# Patient Record
Sex: FEMALE | Race: WHITE | Employment: OTHER | URBAN - METROPOLITAN AREA
[De-identification: names, ages, dates, MRNs, and addresses within clinical notes are randomized per-mention and may not be internally consistent; named-entity substitution may affect disease eponyms.]

---

## 2018-03-28 RX ORDER — METOPROLOL SUCCINATE 100 MG/1
100 TABLET, EXTENDED RELEASE ORAL DAILY
Qty: 90 TABLET | Refills: 3 | Status: SHIPPED | OUTPATIENT
Start: 2018-03-28 | End: 2018-04-18 | Stop reason: DRUGHIGH

## 2018-04-06 ENCOUNTER — HOSPITAL ENCOUNTER (OUTPATIENT)
Age: 73
Discharge: HOME | End: 2018-04-08
Payer: MEDICARE

## 2018-04-06 ENCOUNTER — OFFICE VISIT (OUTPATIENT)
Dept: CARDIOLOGY CLINIC | Age: 73
End: 2018-04-06
Payer: MEDICARE

## 2018-04-06 VITALS
SYSTOLIC BLOOD PRESSURE: 124 MMHG | HEIGHT: 64 IN | HEART RATE: 81 BPM | BODY MASS INDEX: 26.43 KG/M2 | RESPIRATION RATE: 18 BRPM | WEIGHT: 154.8 LBS | DIASTOLIC BLOOD PRESSURE: 82 MMHG

## 2018-04-06 DIAGNOSIS — R94.6 THYROID FUNCTION STUDY ABNORMALITY: ICD-10-CM

## 2018-04-06 DIAGNOSIS — I49.9 IRREGULAR HEART BEAT: Primary | ICD-10-CM

## 2018-04-06 DIAGNOSIS — R06.02 SHORTNESS OF BREATH: ICD-10-CM

## 2018-04-06 LAB
PRO-BNP: 881 PG/ML (ref 0–125)
T3 FREE: 2.5 PG/ML (ref 2–4.4)
T4 FREE: 1.6 NG/DL (ref 0.93–1.7)

## 2018-04-06 PROCEDURE — 93000 ELECTROCARDIOGRAM COMPLETE: CPT | Performed by: INTERNAL MEDICINE

## 2018-04-06 PROCEDURE — 4040F PNEUMOC VAC/ADMIN/RCVD: CPT | Performed by: INTERNAL MEDICINE

## 2018-04-06 PROCEDURE — 1123F ACP DISCUSS/DSCN MKR DOCD: CPT | Performed by: INTERNAL MEDICINE

## 2018-04-06 PROCEDURE — G8419 CALC BMI OUT NRM PARAM NOF/U: HCPCS | Performed by: INTERNAL MEDICINE

## 2018-04-06 PROCEDURE — 83880 ASSAY OF NATRIURETIC PEPTIDE: CPT

## 2018-04-06 PROCEDURE — 3017F COLORECTAL CA SCREEN DOC REV: CPT | Performed by: INTERNAL MEDICINE

## 2018-04-06 PROCEDURE — 1090F PRES/ABSN URINE INCON ASSESS: CPT | Performed by: INTERNAL MEDICINE

## 2018-04-06 PROCEDURE — 99213 OFFICE O/P EST LOW 20 MIN: CPT | Performed by: INTERNAL MEDICINE

## 2018-04-06 PROCEDURE — 84439 ASSAY OF FREE THYROXINE: CPT

## 2018-04-06 PROCEDURE — 3014F SCREEN MAMMO DOC REV: CPT | Performed by: INTERNAL MEDICINE

## 2018-04-06 PROCEDURE — G8427 DOCREV CUR MEDS BY ELIG CLIN: HCPCS | Performed by: INTERNAL MEDICINE

## 2018-04-06 PROCEDURE — G8400 PT W/DXA NO RESULTS DOC: HCPCS | Performed by: INTERNAL MEDICINE

## 2018-04-06 PROCEDURE — 84481 FREE ASSAY (FT-3): CPT

## 2018-04-06 PROCEDURE — 1036F TOBACCO NON-USER: CPT | Performed by: INTERNAL MEDICINE

## 2018-04-12 ENCOUNTER — HOSPITAL ENCOUNTER (OUTPATIENT)
Dept: CARDIOLOGY | Age: 73
Discharge: HOME | End: 2018-04-12
Payer: MEDICARE

## 2018-04-12 DIAGNOSIS — R06.02 SHORTNESS OF BREATH: ICD-10-CM

## 2018-04-12 DIAGNOSIS — I49.9 IRREGULAR HEART BEAT: ICD-10-CM

## 2018-04-12 LAB
LV EF: 46 %
LVEF MODALITY: NORMAL

## 2018-04-12 PROCEDURE — 93306 TTE W/DOPPLER COMPLETE: CPT | Performed by: PSYCHIATRY & NEUROLOGY

## 2018-04-18 ENCOUNTER — TELEPHONE (OUTPATIENT)
Dept: CARDIOLOGY CLINIC | Age: 73
End: 2018-04-18

## 2018-04-18 RX ORDER — METOPROLOL SUCCINATE 100 MG/1
150 TABLET, EXTENDED RELEASE ORAL DAILY
Qty: 90 TABLET | Refills: 3 | Status: SHIPPED
Start: 2018-04-18 | End: 2018-05-29 | Stop reason: SDUPTHER

## 2018-05-08 ENCOUNTER — OFFICE VISIT (OUTPATIENT)
Dept: CARDIOLOGY CLINIC | Age: 73
End: 2018-05-08
Payer: MEDICARE

## 2018-05-08 VITALS
RESPIRATION RATE: 16 BRPM | DIASTOLIC BLOOD PRESSURE: 80 MMHG | BODY MASS INDEX: 26.41 KG/M2 | WEIGHT: 154.7 LBS | HEIGHT: 64 IN | HEART RATE: 89 BPM | SYSTOLIC BLOOD PRESSURE: 124 MMHG

## 2018-05-08 DIAGNOSIS — I49.9 IRREGULAR HEARTBEAT: Primary | ICD-10-CM

## 2018-05-08 PROCEDURE — 4040F PNEUMOC VAC/ADMIN/RCVD: CPT | Performed by: INTERNAL MEDICINE

## 2018-05-08 PROCEDURE — 1123F ACP DISCUSS/DSCN MKR DOCD: CPT | Performed by: INTERNAL MEDICINE

## 2018-05-08 PROCEDURE — 1036F TOBACCO NON-USER: CPT | Performed by: INTERNAL MEDICINE

## 2018-05-08 PROCEDURE — G8400 PT W/DXA NO RESULTS DOC: HCPCS | Performed by: INTERNAL MEDICINE

## 2018-05-08 PROCEDURE — 3017F COLORECTAL CA SCREEN DOC REV: CPT | Performed by: INTERNAL MEDICINE

## 2018-05-08 PROCEDURE — G8427 DOCREV CUR MEDS BY ELIG CLIN: HCPCS | Performed by: INTERNAL MEDICINE

## 2018-05-08 PROCEDURE — 1090F PRES/ABSN URINE INCON ASSESS: CPT | Performed by: INTERNAL MEDICINE

## 2018-05-08 PROCEDURE — 93000 ELECTROCARDIOGRAM COMPLETE: CPT | Performed by: INTERNAL MEDICINE

## 2018-05-08 PROCEDURE — 99213 OFFICE O/P EST LOW 20 MIN: CPT | Performed by: INTERNAL MEDICINE

## 2018-05-08 PROCEDURE — G8419 CALC BMI OUT NRM PARAM NOF/U: HCPCS | Performed by: INTERNAL MEDICINE

## 2018-05-08 RX ORDER — MIRTAZAPINE 7.5 MG/1
7.5 TABLET, FILM COATED ORAL NIGHTLY
Refills: 2 | COMMUNITY
Start: 2018-04-23

## 2018-05-29 RX ORDER — METOPROLOL SUCCINATE 100 MG/1
150 TABLET, EXTENDED RELEASE ORAL DAILY
Qty: 135 TABLET | Refills: 3 | Status: SHIPPED | OUTPATIENT
Start: 2018-05-29 | End: 2019-05-20 | Stop reason: SDUPTHER

## 2018-06-15 ENCOUNTER — OFFICE VISIT (OUTPATIENT)
Dept: CARDIOLOGY CLINIC | Age: 73
End: 2018-06-15
Payer: MEDICARE

## 2018-06-15 VITALS
HEIGHT: 64 IN | WEIGHT: 156 LBS | DIASTOLIC BLOOD PRESSURE: 80 MMHG | HEART RATE: 91 BPM | SYSTOLIC BLOOD PRESSURE: 110 MMHG | RESPIRATION RATE: 16 BRPM | BODY MASS INDEX: 26.63 KG/M2

## 2018-06-15 DIAGNOSIS — I48.91 ATRIAL FIBRILLATION, UNSPECIFIED TYPE (HCC): Primary | ICD-10-CM

## 2018-06-15 PROCEDURE — 93000 ELECTROCARDIOGRAM COMPLETE: CPT | Performed by: INTERNAL MEDICINE

## 2018-06-15 PROCEDURE — G8400 PT W/DXA NO RESULTS DOC: HCPCS | Performed by: INTERNAL MEDICINE

## 2018-06-15 PROCEDURE — G8427 DOCREV CUR MEDS BY ELIG CLIN: HCPCS | Performed by: INTERNAL MEDICINE

## 2018-06-15 PROCEDURE — 1036F TOBACCO NON-USER: CPT | Performed by: INTERNAL MEDICINE

## 2018-06-15 PROCEDURE — 99213 OFFICE O/P EST LOW 20 MIN: CPT | Performed by: INTERNAL MEDICINE

## 2018-06-15 PROCEDURE — 3017F COLORECTAL CA SCREEN DOC REV: CPT | Performed by: INTERNAL MEDICINE

## 2018-06-15 PROCEDURE — 1090F PRES/ABSN URINE INCON ASSESS: CPT | Performed by: INTERNAL MEDICINE

## 2018-06-15 PROCEDURE — 4040F PNEUMOC VAC/ADMIN/RCVD: CPT | Performed by: INTERNAL MEDICINE

## 2018-06-15 PROCEDURE — 1123F ACP DISCUSS/DSCN MKR DOCD: CPT | Performed by: INTERNAL MEDICINE

## 2018-06-15 PROCEDURE — G8419 CALC BMI OUT NRM PARAM NOF/U: HCPCS | Performed by: INTERNAL MEDICINE

## 2018-07-26 ENCOUNTER — OFFICE VISIT (OUTPATIENT)
Dept: CARDIOLOGY CLINIC | Age: 73
End: 2018-07-26
Payer: MEDICARE

## 2018-07-26 VITALS
HEIGHT: 64 IN | BODY MASS INDEX: 27.13 KG/M2 | SYSTOLIC BLOOD PRESSURE: 140 MMHG | WEIGHT: 158.9 LBS | DIASTOLIC BLOOD PRESSURE: 84 MMHG | RESPIRATION RATE: 16 BRPM | HEART RATE: 89 BPM

## 2018-07-26 DIAGNOSIS — R55 SYNCOPE, UNSPECIFIED SYNCOPE TYPE: Primary | ICD-10-CM

## 2018-07-26 PROCEDURE — 1090F PRES/ABSN URINE INCON ASSESS: CPT | Performed by: INTERNAL MEDICINE

## 2018-07-26 PROCEDURE — 1101F PT FALLS ASSESS-DOCD LE1/YR: CPT | Performed by: INTERNAL MEDICINE

## 2018-07-26 PROCEDURE — 4040F PNEUMOC VAC/ADMIN/RCVD: CPT | Performed by: INTERNAL MEDICINE

## 2018-07-26 PROCEDURE — 3017F COLORECTAL CA SCREEN DOC REV: CPT | Performed by: INTERNAL MEDICINE

## 2018-07-26 PROCEDURE — G8419 CALC BMI OUT NRM PARAM NOF/U: HCPCS | Performed by: INTERNAL MEDICINE

## 2018-07-26 PROCEDURE — G8427 DOCREV CUR MEDS BY ELIG CLIN: HCPCS | Performed by: INTERNAL MEDICINE

## 2018-07-26 PROCEDURE — 1036F TOBACCO NON-USER: CPT | Performed by: INTERNAL MEDICINE

## 2018-07-26 PROCEDURE — 99213 OFFICE O/P EST LOW 20 MIN: CPT | Performed by: INTERNAL MEDICINE

## 2018-07-26 PROCEDURE — 93000 ELECTROCARDIOGRAM COMPLETE: CPT | Performed by: INTERNAL MEDICINE

## 2018-07-26 PROCEDURE — 1123F ACP DISCUSS/DSCN MKR DOCD: CPT | Performed by: INTERNAL MEDICINE

## 2018-07-26 PROCEDURE — G8400 PT W/DXA NO RESULTS DOC: HCPCS | Performed by: INTERNAL MEDICINE

## 2018-07-26 RX ORDER — LISINOPRIL 5 MG/1
5 TABLET ORAL DAILY
Qty: 90 TABLET | Refills: 3 | Status: SHIPPED | OUTPATIENT
Start: 2018-07-26 | End: 2019-07-16 | Stop reason: SDUPTHER

## 2018-07-26 RX ORDER — LISINOPRIL 5 MG/1
5 TABLET ORAL DAILY
Qty: 30 TABLET | Refills: 3 | Status: SHIPPED | OUTPATIENT
Start: 2018-07-26 | End: 2018-07-26 | Stop reason: SDUPTHER

## 2018-07-26 NOTE — PROGRESS NOTES
vomiting  Genitourinary:negative for dysuria and hematuria  Derm: negative for rash and skin lesion(s)  Neurological: negative for seizures and tremors  Endocrine: negative for diabetic symptoms including polydipsia and polyuria  Musculoskeletal: negative for CTD  Psychiatric: negative for psychosis and major depression    On examination, she is an alert, pleasant, elderly woman in no distress. Skin is warm and dry. Respirations are unlabored. BP (!) 140/84   Pulse 89   Resp 16   Ht 5' 4\" (1.626 m)   Wt 158 lb 14.4 oz (72.1 kg)   BMI 27.28 kg/m²  HEENT negative for scleral icterus. Extraocular muscles intact. No facial asymmetry or central cyanosis. Neck without masses or goiter. No bruit or JVD. Cardiac apex not displaced. Rhythm regular. Abdomen normal.  Extremities without edema. Lungs are clear. EKG today shows sinus rhythm at 89/m. (Posterior axis. IVCD. Nonspecific TR normality laterally. She is asymptomatic. She has a mild reduction in ejection fraction and her proBNP is only mildly elevated. She has hyperlipidemia and diabetes mellitus. Therefore, she should benefit by ACEI or ARB. She is given a prescription for lisinopril 5 mg per day and asked to see Dr. Garrett Yung back in the next 1-2 week for a blood pressure check and probably a BMP. She will have outpatient cardiac follow-up in one year.     She has begun an exercise program and is encouraged to maintain    At completion of today's visit, medications include the following:    Current Outpatient Prescriptions:     metoprolol succinate (TOPROL XL) 100 MG extended release tablet, Take 1.5 tablets by mouth daily, Disp: 135 tablet, Rfl: 3    mirtazapine (REMERON) 15 MG tablet, TK 1 T PO QD, Disp: , Rfl: 2    aspirin 81 MG tablet, Take 81 mg by mouth daily , Disp: , Rfl:     sitaGLIPtan-metformin (JANUMET)  MG per tablet, Take 1 tablet by mouth 2 times daily (with meals), Disp: , Rfl:     pantoprazole (PROTONIX) 20 MG

## 2019-01-21 ENCOUNTER — TELEPHONE (OUTPATIENT)
Dept: CARDIOLOGY CLINIC | Age: 74
End: 2019-01-21

## 2019-05-21 RX ORDER — METOPROLOL SUCCINATE 100 MG/1
TABLET, EXTENDED RELEASE ORAL
Qty: 270 TABLET | Refills: 3 | Status: SHIPPED | OUTPATIENT
Start: 2019-05-21 | End: 2019-08-07 | Stop reason: SDUPTHER

## 2019-07-17 RX ORDER — LISINOPRIL 5 MG/1
5 TABLET ORAL DAILY
Qty: 90 TABLET | Refills: 3 | OUTPATIENT
Start: 2019-07-17

## 2019-07-17 RX ORDER — LISINOPRIL 5 MG/1
5 TABLET ORAL DAILY
Qty: 90 TABLET | Refills: 3 | Status: SHIPPED
Start: 2019-07-17 | End: 2020-07-06

## 2019-07-23 ENCOUNTER — OFFICE VISIT (OUTPATIENT)
Dept: CARDIOLOGY CLINIC | Age: 74
End: 2019-07-23
Payer: MEDICARE

## 2019-07-23 VITALS
RESPIRATION RATE: 16 BRPM | WEIGHT: 159.8 LBS | DIASTOLIC BLOOD PRESSURE: 60 MMHG | HEIGHT: 64 IN | BODY MASS INDEX: 27.28 KG/M2 | HEART RATE: 77 BPM | SYSTOLIC BLOOD PRESSURE: 118 MMHG

## 2019-07-23 DIAGNOSIS — R94.30 EJECTION FRACTION < 50%: ICD-10-CM

## 2019-07-23 DIAGNOSIS — R06.02 SHORTNESS OF BREATH: ICD-10-CM

## 2019-07-23 DIAGNOSIS — R55 SYNCOPE, UNSPECIFIED SYNCOPE TYPE: Primary | ICD-10-CM

## 2019-07-23 PROCEDURE — 1090F PRES/ABSN URINE INCON ASSESS: CPT | Performed by: INTERNAL MEDICINE

## 2019-07-23 PROCEDURE — G8400 PT W/DXA NO RESULTS DOC: HCPCS | Performed by: INTERNAL MEDICINE

## 2019-07-23 PROCEDURE — 3017F COLORECTAL CA SCREEN DOC REV: CPT | Performed by: INTERNAL MEDICINE

## 2019-07-23 PROCEDURE — G8427 DOCREV CUR MEDS BY ELIG CLIN: HCPCS | Performed by: INTERNAL MEDICINE

## 2019-07-23 PROCEDURE — 99213 OFFICE O/P EST LOW 20 MIN: CPT | Performed by: INTERNAL MEDICINE

## 2019-07-23 PROCEDURE — 4040F PNEUMOC VAC/ADMIN/RCVD: CPT | Performed by: INTERNAL MEDICINE

## 2019-07-23 PROCEDURE — 1123F ACP DISCUSS/DSCN MKR DOCD: CPT | Performed by: INTERNAL MEDICINE

## 2019-07-23 PROCEDURE — 93000 ELECTROCARDIOGRAM COMPLETE: CPT | Performed by: INTERNAL MEDICINE

## 2019-07-23 PROCEDURE — G8419 CALC BMI OUT NRM PARAM NOF/U: HCPCS | Performed by: INTERNAL MEDICINE

## 2019-07-23 PROCEDURE — 1036F TOBACCO NON-USER: CPT | Performed by: INTERNAL MEDICINE

## 2019-07-23 RX ORDER — GUAIFENESIN AND DEXTROMETHORPHAN HYDROBROMIDE 600; 30 MG/1; MG/1
TABLET, EXTENDED RELEASE ORAL
COMMUNITY

## 2019-08-01 ENCOUNTER — HOSPITAL ENCOUNTER (OUTPATIENT)
Dept: CARDIOLOGY | Age: 74
Discharge: HOME | End: 2019-08-01
Payer: MEDICARE

## 2019-08-01 DIAGNOSIS — R94.30 EJECTION FRACTION < 50%: ICD-10-CM

## 2019-08-01 DIAGNOSIS — R06.02 SHORTNESS OF BREATH: ICD-10-CM

## 2019-08-01 PROCEDURE — 93308 TTE F-UP OR LMTD: CPT

## 2019-08-07 ENCOUNTER — TELEPHONE (OUTPATIENT)
Dept: CARDIOLOGY CLINIC | Age: 74
End: 2019-08-07

## 2019-08-08 RX ORDER — METOPROLOL SUCCINATE 100 MG/1
100 TABLET, EXTENDED RELEASE ORAL 2 TIMES DAILY
Qty: 180 TABLET | Refills: 3
Start: 2019-08-08 | End: 2020-08-06

## 2020-07-06 RX ORDER — LISINOPRIL 5 MG/1
TABLET ORAL
Qty: 90 TABLET | Refills: 3 | Status: SHIPPED
Start: 2020-07-06 | End: 2021-06-28

## 2020-08-07 RX ORDER — METOPROLOL SUCCINATE 100 MG/1
TABLET, EXTENDED RELEASE ORAL
Qty: 270 TABLET | Refills: 0 | Status: SHIPPED
Start: 2020-08-07 | End: 2021-02-01 | Stop reason: SDUPTHER

## 2020-10-15 ENCOUNTER — OFFICE VISIT (OUTPATIENT)
Dept: CARDIOLOGY CLINIC | Age: 75
End: 2020-10-15
Payer: MEDICARE

## 2020-10-15 VITALS
BODY MASS INDEX: 28.51 KG/M2 | HEART RATE: 74 BPM | WEIGHT: 167 LBS | HEIGHT: 64 IN | RESPIRATION RATE: 18 BRPM | DIASTOLIC BLOOD PRESSURE: 66 MMHG | SYSTOLIC BLOOD PRESSURE: 108 MMHG

## 2020-10-15 PROCEDURE — 93000 ELECTROCARDIOGRAM COMPLETE: CPT | Performed by: INTERNAL MEDICINE

## 2020-10-15 PROCEDURE — 99213 OFFICE O/P EST LOW 20 MIN: CPT | Performed by: INTERNAL MEDICINE

## 2020-10-15 PROCEDURE — 4040F PNEUMOC VAC/ADMIN/RCVD: CPT | Performed by: INTERNAL MEDICINE

## 2020-10-15 PROCEDURE — 3017F COLORECTAL CA SCREEN DOC REV: CPT | Performed by: INTERNAL MEDICINE

## 2020-10-15 PROCEDURE — 1090F PRES/ABSN URINE INCON ASSESS: CPT | Performed by: INTERNAL MEDICINE

## 2020-10-15 PROCEDURE — G8427 DOCREV CUR MEDS BY ELIG CLIN: HCPCS | Performed by: INTERNAL MEDICINE

## 2020-10-15 PROCEDURE — G8400 PT W/DXA NO RESULTS DOC: HCPCS | Performed by: INTERNAL MEDICINE

## 2020-10-15 PROCEDURE — G8484 FLU IMMUNIZE NO ADMIN: HCPCS | Performed by: INTERNAL MEDICINE

## 2020-10-15 PROCEDURE — 1036F TOBACCO NON-USER: CPT | Performed by: INTERNAL MEDICINE

## 2020-10-15 PROCEDURE — G8417 CALC BMI ABV UP PARAM F/U: HCPCS | Performed by: INTERNAL MEDICINE

## 2020-10-15 PROCEDURE — 1123F ACP DISCUSS/DSCN MKR DOCD: CPT | Performed by: INTERNAL MEDICINE

## 2020-10-15 RX ORDER — METFORMIN HYDROCHLORIDE 750 MG/1
750 TABLET, EXTENDED RELEASE ORAL
COMMUNITY
End: 2022-01-27 | Stop reason: ALTCHOICE

## 2020-10-15 NOTE — PROGRESS NOTES
[facility]  Jennifer Lesser. KEVIN Sears. Wei Small M.D.  Marissa Riddle. Michelle Bell M.D. Veneta Portela, Darlena Bloch, M.D. Edison Bellamy M.D. MD Denzel Christensen   1945  Francois Gupta MD      This 82-year-old woman is seen today for outpatient cardiac assessment. She has a history of catheterization documented ASCVD (2015). She has had hypertension hyperlipidemia and diabetes. An echo in 2017 showed mild depression in LVEF with evidence for diastolic dysfunction. She is sedentary. She is not having any chest pain or dyspnea. She fatigues easily. Medical History:  1. ASCVD. Angiogram 02/2015 [facility] Moderate CAD. 2. Asthma. 3. History of chest pain. 4. Hyperlipidemia. Cholesterol 112 , triglycerides 117, HDL 43, LDL 54 (2016). 5. Hypertension. 6. Paroxysmal SVT. 7. Diabetes mellitus. 8. GERD. 9. Syncope 12/11/2016. 10. Echo 01/13/2017. Severe LA dilatation. LA index 54. Distal septum and apex appear dyskinetic. No myocardial thinning.  Lateral and inferior hypokinesis.  Biplane EF = 45%. Mild MR. ERO = 0.1. Vena contracta 0.2 cm. 11. Lexiscan MPS 01/19/2017. EF 43%. Small fixed apical defect. Small reversible defect inferoseptal segment. 12. OP cardiac assessment 04/04/2017.  Asymptomatic on metoprolol succinate 100 mg daily. 13. Thyroid function: T4 = 1.6     T3= 2.5  -  04/2018. 14. Echo, 04/12/2018. Biplane EF 46%.  Global hypokinesis. Stage II diastolic dysfunction.  LA mildly dilated.  RVSP normal.   15. ProBNP=  881 - 04/12/2018. 16. OP cardiac assessment, 06/15/18. Systolic 002. No medication changes. 17. Lipid panel 09/2020:  Total cholesterol 159, triglycerides 165, HDL 44, LDL 82      Review of Systems:  Constitutional: negative for fever and chills  Respiratory: negative for cough and hemoptysis  Cardiovascular:   Gastrointestinal: negative for abdominal pain, diarrhea, nausea and vomiting  Genitourinary:negative for dysuria and hematuria  Derm: negative for rash and skin lesion(s)  Neurological: negative for seizures and tremors  Endocrine: negative for diabetic symptoms including polydipsia and polyuria  Musculoskeletal: negative for CTD  Psychiatric: negative for psychosis and major depression    On examination, she is an alert pleasant elderly woman in no distress   skin is warm and dry. Respirations are unlabored. /66   Pulse 74   Resp 18   Ht 5' 4\" (1.626 m)   Wt 167 lb (75.8 kg)   BMI 28.67 kg/m² . HEENT negative for scleral icterus. Extraocular muscles intact. No facial asymmetry or central cyanosis. Neck without masses or goiter. No bruit or JVD. Cardiac apex not displaced. Rhythm regular. Abdomen normal.  Extremities without edema. Lungs are clear    EKG today per Dr. Parsons Lines interpretation: Sinus rhythm 74/min. Mild nonspecific IVCD and posterior-leftward axis    Clinically the patient is stable from a cardiovascular standpoint. She has ASCVD and diabetes. She will benefit by secondary risk reduction. Current recommendations are to target LDL 70 or less. . And adjustments made per recommendations. She will have cardiac follow-up in 1 year.     At completion of today's visit, medications include the following:    Current Outpatient Medications:     SITagliptin (JANUVIA) 100 MG tablet, Take 100 mg by mouth daily, Disp: , Rfl:     metFORMIN (GLUCOPHAGE-XR) 750 MG extended release tablet, Take 750 mg by mouth daily (with breakfast), Disp: , Rfl:     metoprolol succinate (TOPROL XL) 100 MG extended release tablet, TAKE 1 AND 1/2 TABLETS BY MOUTH EVERY DAY, Disp: 270 tablet, Rfl: 0    lisinopril (PRINIVIL;ZESTRIL) 5 MG tablet, TAKE 1 TABLET BY MOUTH EVERY DAY, Disp: 90 tablet, Rfl: 3    PROAIR  (90 Base) MCG/ACT inhaler, INL 2 PFS PO 6 TIMES PER DAY PRN, Disp: , Rfl: 0    Dextromethorphan-guaiFENesin (MUCINEX DM)  MG TB12, Take by mouth, Disp: , Rfl:     mirtazapine (REMERON) 7.5 MG tablet, 7.5 mg nightly , Disp: , Rfl: 2    aspirin 81 MG tablet, Take 81 mg by mouth daily , Disp: , Rfl:     pantoprazole (PROTONIX) 20 MG tablet, Take 20 mg by mouth daily, Disp: , Rfl:     atorvastatin (LIPITOR) 40 MG tablet, Take 40 mg by mouth daily, Disp: , Rfl:     sitaGLIPtan-metformin (JANUMET)  MG per tablet, Take 1 tablet by mouth 2 times daily (with meals), Disp: , Rfl:       Note: This report was completed utilizing computer voice recognition software. Every effort has been made to ensure accuracy, however; inadvertent computerized transcription errors may be present.     --Alexsander Woody MD on 10/15/2020 at 6:11 PM

## 2021-02-01 RX ORDER — METOPROLOL SUCCINATE 100 MG/1
TABLET, EXTENDED RELEASE ORAL
Qty: 270 TABLET | Refills: 3 | Status: SHIPPED | OUTPATIENT
Start: 2021-02-01 | End: 2022-07-18 | Stop reason: SDUPTHER

## 2021-06-28 RX ORDER — LISINOPRIL 5 MG/1
TABLET ORAL
Qty: 90 TABLET | Refills: 3 | Status: SHIPPED | OUTPATIENT
Start: 2021-06-28

## 2021-08-24 ENCOUNTER — TELEPHONE (OUTPATIENT)
Dept: CARDIOLOGY CLINIC | Age: 76
End: 2021-08-24

## 2021-08-24 NOTE — TELEPHONE ENCOUNTER
[facility] pt. Pt called to sched Annual w/ [facility]. Unable to sched pt at this time.  Pt due approx 10/15/2021

## 2021-08-25 NOTE — TELEPHONE ENCOUNTER
Left message for patient to contact office. Patient to be scheduled with Dr. Shayy Ross the week of 10/4/2021.

## 2021-09-12 ENCOUNTER — APPOINTMENT (OUTPATIENT)
Dept: GENERAL RADIOLOGY | Age: 76
End: 2021-09-12
Payer: MEDICARE

## 2021-09-12 ENCOUNTER — HOSPITAL ENCOUNTER (EMERGENCY)
Age: 76
Discharge: HOME | End: 2021-09-12
Payer: MEDICARE

## 2021-09-12 VITALS
RESPIRATION RATE: 14 BRPM | HEIGHT: 64 IN | HEART RATE: 75 BPM | BODY MASS INDEX: 27.14 KG/M2 | OXYGEN SATURATION: 93 % | WEIGHT: 159 LBS | DIASTOLIC BLOOD PRESSURE: 68 MMHG | SYSTOLIC BLOOD PRESSURE: 157 MMHG | TEMPERATURE: 97.2 F

## 2021-09-12 DIAGNOSIS — S53.409A ELBOW SPRAIN, INITIAL ENCOUNTER: ICD-10-CM

## 2021-09-12 DIAGNOSIS — S42.212A FX HUMERAL NECK, LEFT, CLOSED, INITIAL ENCOUNTER: Primary | ICD-10-CM
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
## 2021-09-12 PROCEDURE — 6370000000 HC RX 637 (ALT 250 FOR IP): Performed by: NURSE PRACTITIONER

## 2021-09-12 PROCEDURE — 73060 X-RAY EXAM OF HUMERUS: CPT

## 2021-09-12 PROCEDURE — 73070 X-RAY EXAM OF ELBOW: CPT

## 2021-09-12 PROCEDURE — 73030 X-RAY EXAM OF SHOULDER: CPT

## 2021-09-12 PROCEDURE — 99285 EMERGENCY DEPT VISIT HI MDM: CPT

## 2021-09-12 RX ORDER — HYDROCODONE BITARTRATE AND ACETAMINOPHEN 5; 325 MG/1; MG/1
1 TABLET ORAL ONCE
Status: COMPLETED | OUTPATIENT
Start: 2021-09-12 | End: 2021-09-12

## 2021-09-12 RX ORDER — HYDROCODONE BITARTRATE AND ACETAMINOPHEN 5; 325 MG/1; MG/1
1 TABLET ORAL EVERY 6 HOURS PRN
Qty: 12 TABLET | Refills: 0 | Status: SHIPPED | OUTPATIENT
Start: 2021-09-12 | End: 2021-09-15

## 2021-09-12 RX ORDER — ONDANSETRON 4 MG/1
4 TABLET, ORALLY DISINTEGRATING ORAL ONCE
Status: COMPLETED | OUTPATIENT
Start: 2021-09-12 | End: 2021-09-12

## 2021-09-12 RX ORDER — NAPROXEN 500 MG/1
500 TABLET ORAL 2 TIMES DAILY PRN
Qty: 14 TABLET | Refills: 0 | Status: SHIPPED | OUTPATIENT
Start: 2021-09-12 | End: 2022-01-05

## 2021-09-12 RX ADMIN — HYDROCODONE BITARTRATE AND ACETAMINOPHEN 1 TABLET: 5; 325 TABLET ORAL at 12:10

## 2021-09-12 RX ADMIN — ONDANSETRON 4 MG: 4 TABLET, ORALLY DISINTEGRATING ORAL at 12:10

## 2021-09-12 ASSESSMENT — PAIN SCALES - GENERAL
PAINLEVEL_OUTOF10: 10
PAINLEVEL_OUTOF10: 9

## 2021-09-12 ASSESSMENT — PAIN DESCRIPTION - ORIENTATION: ORIENTATION: LEFT

## 2021-09-12 ASSESSMENT — PAIN DESCRIPTION - PAIN TYPE: TYPE: ACUTE PAIN

## 2021-09-12 ASSESSMENT — PAIN DESCRIPTION - LOCATION: LOCATION: SHOULDER

## 2021-09-12 NOTE — ED PROVIDER NOTES
ED Attending  CC: Gabriela         [facility]  Department of Emergency Medicine   ED  Encounter Note  Admit Date/RoomTime: 2021 11:35 AM  ED Room: 37/    NAME: Eliana Calzada  : 1945  MRN: [id number]     Chief Complaint:  Arm Injury (mechanical fall this am fell onto lt arm Pain lt elbow to shoulder)    History of Present Illness       Eliana Calzada is a 78 y.o. old female who presents to the emergency department by private vehicle with , for a mechanical fall which occured 8 AM prior to arrival. She reportedly was hopping attempting to put her pants on and fell to her side onto her left elbow and shoulder region and has a positive deformity left upper arm. Patient is right-hand dominant. The patients tetanus status is unknown. Since onset the symptoms have been persistent and worsening. She is guarding all movements of her left arm and holding it close to her body. Her pain is aggraveated by any movement, any use of, certain movements or pressure on or palpation of painful area and relieved by nothing, as no treatment has been provided prior to this visit. She denies any head injury, headache, loss of consciousness, confusion, dizziness, neck pain, chest pain, abdominal pain, back pain, numbness, weakness and blurred vision. Patient does complain of nausea with no emesis. She denies any anticoagulation. She takes no blood thinning agents. ROS   Pertinent positives and negatives are stated within HPI, all other systems reviewed and are negative. Past Medical History:  has a past medical history of A-fib ([address] Asthma, Diabetes ([address] GERD (gastroesophageal reflux disease), Hyperlipidemia, and Syncope. Surgical History:  has a past surgical history that includes Tonsillectomy; Colonoscopy; and Endoscopy, colon, diagnostic. Social History:  reports that she has never smoked.  She has never used smokeless tobacco. She reports that she does not drink alcohol and does not use drugs. Family History: family history includes Asthma in her brother; Cancer in her father; Heart Disease (age of onset: 67) in her mother; High Blood Pressure in her mother; No Known Problems in her sister. Allergies: Levaquin [levofloxacin in d5w]    Physical Exam   Oxygen Saturation Interpretation: Normal.        ED Triage Vitals [09/12/21 1132]   BP Temp Temp Source Pulse Resp SpO2 Height Weight   (!) 157/68 97.2 °F (36.2 °C) Temporal 75 14 93 % 5' 4\" (1.626 m) 159 lb (72.1 kg)         Physical Exam  Constitutional:  Alert, development consistent with age. HEENT:  NC/NT. Airway patent. Atraumatic no raccoons or masters sign noted. No septal hematoma or hemotympanum. No oral abrasions or loose dentition. PERRLA. No facial bony tenderness. Neck:  No midline or paravertebral tenderness to firm palpation. .  Normal ROM. Supple. Chest:  Symmetrical without visible rash or tenderness. Respiratory:  Lungs Clear to auscultation and breath sounds equal.  CV:  Regular rate and rhythm, normal heart sounds, without pathological murmurs, ectopy, gallops, or rubs. GI:  Abdomen Soft, nontender, good bowel sounds. No firm or pulsatile mass. Pelvis:  Stable, nontender to palpation. Back:  No midline or paravertebral tenderness. No costovertebral tenderness. Extremities: No tenderness or edema noted. 2+ radial pulse on the left with tenderness to her elbow and shoulder region and guarding of movements of the left arm. Capillary refill less than 3 seconds in distal fingers. No snuffbox tenderness. No wrist tenderness. Strong hand grasp noted bilaterally. 2+ pedal and posterior tibial pulses intact. Integument:  Normal turgor. Warm, dry, without visible rash, unless noted elsewhere. Lymphatic: no lymphadenopathy noted  Neurological:  Oriented x3, GCS 15. Motor functions intact. Cranial nerves II through XII grossly intact.     Lab / Imaging Results   (All laboratory and radiology results have been personally reviewed by myself)  Labs:  No results found for this visit on 09/12/21. Imaging: All Radiology results interpreted by Radiologist unless otherwise noted. XR HUMERUS LEFT (MIN 2 VIEWS)   Final Result   Impacted minimally displaced fracture of the neck of the left humerus. No   additional fractures are identified. XR ELBOW LEFT (2 VIEWS)   Final Result   No acute abnormality. XR SHOULDER LEFT (MIN 2 VIEWS)   Final Result   Impacted minimally displaced fracture of the neck of the left humerus. No   additional fractures are identified. ED Course / Medical Decision Making     Medications   HYDROcodone-acetaminophen (NORCO) 5-325 MG per tablet 1 tablet (1 tablet Oral Given 9/12/21 1210)   ondansetron (ZOFRAN-ODT) disintegrating tablet 4 mg (4 mg Oral Given 9/12/21 1210)     ED Course as of Sep 12 2349   Sun Sep 12, 2021   1256 Dr. Lena Knowles into examine patient.    [SE]      ED Course User Index  [SE] BETSY Sidhu - CNP     Re-examination:  9/12/21     Time: 1230 patients symptoms are improving and is no longer nauseated pain improving. Consult(s):   None    Procedure(s):  None    MDM:   This is a 79-year-old female patient who arrives today after sustaining a mechanical fall and has a positive deformity of her left shoulder and x-ray reveals that she has a minimally impacted displaced fracture of the neck of the left humerus. Patient has no neurologic or sensory deficit on examination. All compartments are soft and compressible. Patient has intact radial pulses. X-ray of the elbow is negative for fracture. Patient has no midline bony tenderness to her cervical spine and denies any head injury and does not take anticoagulation. Patient was medicated with Norco and Zofran had improvement of symptoms once the sling was placed. Patient advised on follow-up with PCP and orthopedic surgeon in the next week.   Patient is concerned because she is going out of town for vacation and was instructed she should try to get into see the orthopedic before she goes on vacation. Patient also advised on signs and symptoms warranting immediate return to the ED for reevaluation at any time. Patient also instructed on opioid safety precautions. Controlled Substance Monitoring:    Acute and Chronic Pain Monitoring:   RX Monitoring 9/12/2021   Periodic Controlled Substance Monitoring Possible medication side effects, risk of tolerance/dependence & alternative treatments discussed. ;No signs of potential drug abuse or diversion identified. Plan of Care/Counseling:  BETSY Lerma CNP and EM Attending Physician reviewed today's visit with the patient in addition to providing specific details for the plan of care and counseling regarding the diagnosis and prognosis. Questions are answered at this time and are agreeable with the plan. Assessment      1. Fx humeral neck, left, closed, initial encounter    2. Elbow sprain, initial encounter    3. Fall on same level from slipping, tripping or stumbling, initial encounter      Plan   Discharged home. Patient condition is good    New Medications     Discharge Medication List as of 9/12/2021  1:04 PM      START taking these medications    Details   naproxen (NAPROSYN) 500 MG tablet Take 1 tablet by mouth 2 times daily as needed for Pain (take with food and full glass of water.), Disp-14 tablet, R-0Normal      HYDROcodone-acetaminophen (NORCO) 5-325 MG per tablet Take 1 tablet by mouth every 6 hours as needed for Pain for up to 3 days. , Disp-12 tablet, R-0Normal           Electronically signed by BETSY Lerma CNP   DD: 9/12/21  **This report was transcribed using voice recognition software. Every effort was made to ensure accuracy; however, inadvertent computerized transcription errors may be present.   END OF ED PROVIDER NOTE      BETSY Lerma CNP  09/12/21 8350

## 2021-09-13 ENCOUNTER — TELEPHONE (OUTPATIENT)
Dept: ADMINISTRATIVE | Age: 76
End: 2021-09-13

## 2021-09-13 NOTE — TELEPHONE ENCOUNTER
Patient called in to check status of appointment. They are leaving for vacation on Wednesday of this week and will return on 9/26 and would like to know if she should be seen before they go. Advised that a member of the office will be contacting them in regards tot getting scheduled.

## 2021-09-13 NOTE — TELEPHONE ENCOUNTER
Chief Complaint:  Arm Injury (mechanical fall this am fell onto lt arm Pain lt elbow to shoulder)    XR HUMERUS LEFT (MIN 2 VIEWS)   Final Result   Impacted minimally displaced fracture of the neck of the left humerus. No   additional fractures are identified.           XR ELBOW LEFT (2 VIEWS)   Final Result   No acute abnormality.           XR SHOULDER LEFT (MIN 2 VIEWS)   Final Result   Impacted minimally displaced fracture of the neck of the left humerus. No   additional fractures are identified. Routed to Providers for consideration and scheduling recommendations.

## 2021-09-13 NOTE — TELEPHONE ENCOUNTER
Pt was in [place] [id number] on 9/12 for:   Fx humeral neck, left, closed, initial encounter     Elbow sprain, initial encounter     Fall on same level from slipping, tripping or stumbling, initial encounter  And needs to have a follow up with Dr Daquan Briones.

## 2021-09-24 ENCOUNTER — TELEPHONE (OUTPATIENT)
Dept: ORTHOPEDIC SURGERY | Age: 76
End: 2021-09-24

## 2021-09-24 DIAGNOSIS — S42.202A CLOSED FRACTURE OF PROXIMAL END OF LEFT HUMERUS, UNSPECIFIED FRACTURE MORPHOLOGY, INITIAL ENCOUNTER: Primary | ICD-10-CM

## 2021-09-29 ENCOUNTER — OFFICE VISIT (OUTPATIENT)
Dept: ORTHOPEDIC SURGERY | Age: 76
End: 2021-09-29
Payer: MEDICARE

## 2021-09-29 ENCOUNTER — HOSPITAL ENCOUNTER (OUTPATIENT)
Dept: GENERAL RADIOLOGY | Age: 76
Discharge: HOME | End: 2021-10-01
Payer: MEDICARE

## 2021-09-29 VITALS — TEMPERATURE: 98.5 F

## 2021-09-29 DIAGNOSIS — S42.202A CLOSED FRACTURE OF PROXIMAL END OF LEFT HUMERUS, UNSPECIFIED FRACTURE MORPHOLOGY, INITIAL ENCOUNTER: Primary | ICD-10-CM

## 2021-09-29 DIAGNOSIS — S42.202A CLOSED FRACTURE OF PROXIMAL END OF LEFT HUMERUS, UNSPECIFIED FRACTURE MORPHOLOGY, INITIAL ENCOUNTER: ICD-10-CM

## 2021-09-29 PROCEDURE — G8417 CALC BMI ABV UP PARAM F/U: HCPCS | Performed by: ORTHOPAEDIC SURGERY

## 2021-09-29 PROCEDURE — 99202 OFFICE O/P NEW SF 15 MIN: CPT | Performed by: ORTHOPAEDIC SURGERY

## 2021-09-29 PROCEDURE — 99203 OFFICE O/P NEW LOW 30 MIN: CPT | Performed by: ORTHOPAEDIC SURGERY

## 2021-09-29 PROCEDURE — 73030 X-RAY EXAM OF SHOULDER: CPT

## 2021-09-29 PROCEDURE — 4040F PNEUMOC VAC/ADMIN/RCVD: CPT | Performed by: ORTHOPAEDIC SURGERY

## 2021-09-29 PROCEDURE — 1036F TOBACCO NON-USER: CPT | Performed by: ORTHOPAEDIC SURGERY

## 2021-09-29 PROCEDURE — 3017F COLORECTAL CA SCREEN DOC REV: CPT | Performed by: ORTHOPAEDIC SURGERY

## 2021-09-29 PROCEDURE — G8427 DOCREV CUR MEDS BY ELIG CLIN: HCPCS | Performed by: ORTHOPAEDIC SURGERY

## 2021-09-29 PROCEDURE — G8400 PT W/DXA NO RESULTS DOC: HCPCS | Performed by: ORTHOPAEDIC SURGERY

## 2021-09-29 PROCEDURE — 1123F ACP DISCUSS/DSCN MKR DOCD: CPT | Performed by: ORTHOPAEDIC SURGERY

## 2021-09-29 PROCEDURE — 1090F PRES/ABSN URINE INCON ASSESS: CPT | Performed by: ORTHOPAEDIC SURGERY

## 2021-09-29 RX ORDER — INSULIN DEGLUDEC INJECTION 100 U/ML
10 INJECTION, SOLUTION SUBCUTANEOUS DAILY
COMMUNITY
Start: 2021-09-15

## 2021-09-29 RX ORDER — HYDROCODONE BITARTRATE AND ACETAMINOPHEN 5; 325 MG/1; MG/1
1 TABLET ORAL EVERY 6 HOURS PRN
COMMUNITY
End: 2021-10-06 | Stop reason: SDUPTHER

## 2021-09-29 RX ORDER — EZETIMIBE 10 MG/1
1 TABLET ORAL DAILY
COMMUNITY
Start: 2021-08-29 | End: 2022-02-02 | Stop reason: ALTCHOICE

## 2021-09-29 RX ORDER — EMPAGLIFLOZIN 25 MG/1
25 TABLET, FILM COATED ORAL DAILY
COMMUNITY

## 2021-09-29 NOTE — PROGRESS NOTES
Chief Complaint   Patient presents with   [facility] ED Follow-up     L humeral neck fx 09/12/21. slipped while getting dressed. c/o soreness and pain. SUBJECTIVE: Patient is a very pleasant 44-year-old female who is status post fall on 9/12/2021. She suffered a left proximal humerus fracture. She was sent to us for definitive management. She is currently wearing a sling and controlling pain well with Norco provided by her primary care physician. She denies any other areas of pain. She is having significant pain in the evening especially when trying to go to sleep. She denies any further injury. Review of Systems   Constitutional: Negative for fever, chills, diaphoresis, appetite change and fatigue. HENT: Negative for dental issues, hearing loss and tinnitus. Negative for congestion, sinus pressure, sneezing, sore throat. Negative for headache. Eyes: Negative for visual disturbance, blurred and double vision. Negative for pain, discharge, redness and itching  Respiratory: Negative for cough, shortness of breath and wheezing. Cardiovascular: Negative for chest pain, palpitations and leg swelling. No dyspnea on exertion   Gastrointestinal:   Negative for nausea, vomiting, abdominal pain, diarrhea, constipation  or black or bloody. Hematologic\Lymphatic:  negative for bleeding, petechiae,   Genitourinary: Negative for hematuria and difficulty urinating. Musculoskeletal: Negative for neck pain and stiffness. Negative for back pain, see HPI  Skin: Negative for pallor, rash and wound. Neurological: Negative for dizziness, tremors, seizures, weakness, light-headedness, no TIA or stroke symptoms. No numbness and headaches. Psychiatric/Behavioral: Negative.         Past Medical History:   Diagnosis Date    [facility] [facility] [address] [address]     Asthma     [facility]     GERD (gastroesophageal reflux disease)     Hyperlipidemia     Syncope      Past Surgical History:   Procedure Laterality Date    COLONOSCOPY      ENDOSCOPY, COLON, DIAGNOSTIC      TONSILLECTOMY       Family History   Problem Relation Age of Onset    High Blood Pressure Mother     Heart Disease Mother 67        stents     Cancer Father         pancreas & liver     No Known Problems Sister     Asthma Brother      Social History     Tobacco Use    Smoking status: Never Smoker    Smokeless tobacco: Never Used   Vaping Use    Vaping Use: Never used   Substance Use Topics    Alcohol use: No    Drug use: No     Allergies   Allergen Reactions    Levaquin [Levofloxacin In D5w]      Feet and legs swell       OBJECTIVE:      Physical Examination:   General appearance: alert, well appearing, and in no distress,  normal appearing weight. No visible signs of trauma   Mental status: alert, oriented to person, place, and time, normal mood, behavior, speech, dress, motor activity, and thought processes  Abdomen: soft, nondistended  Resp:   resp easy and unlabored, no audible wheezes note, normal symmetrical expansion of both hemithoraces  Cardiac: distal pulses palpable, skin and extremities well perfused  Neurological: alert, oriented X3, normal speech, no focal findings or movement disorder noted, motor and sensory grossly normal bilaterally, normal muscle tone, no tremors, strength 5/5, normal gait and station  HEENT: normochephalic atraumatic, external ears and eyes normal, sclera normal, neck supple, no nasal discharge.    Extremities:   peripheral pulses normal, no edema, redness or tenderness in the calves   Skin: normal coloration, no rashes or open wounds, no suspicious skin lesions noted  Psych: Affect euthymic   Musculoskeletal:   Extremity:  Upper extremity   Skin intact, ecchymosis in the axilla and some mild swelling   Tenderness to palpation about the proximal humerus with some crepitus   No elbow wrist or finger pain   Compartments soft and compressible   Fires M U R nerves   2/4 radial pulse   Sensation intact   Capillary refill less than 3 seconds        Temp 98.5 °F (36.9 °C) (Oral)      XR: 9/29/21 x-ray shows a left proximal humerus fracture, 2/3 part with pseudosubluxation but overall improved alignment from injury films       ASSESSMENT:     Diagnosis Orders   1. Closed fracture of proximal end of left humerus, unspecified fracture morphology, initial encounter  External Referral To Physical Therapy        Discussion: Talk to the patient and her  in detail about operative versus nonoperative treatment of her left proximal humerus. She is right-hand dominant. She would like to treat this nonoperatively if possible. I do think that it should be treated nonoperatively at least initially because it has lined up better. We will see her back in 2 weeks and still looks at that point in time. If she stays well aligned we can treated nonoperatively. We will start some gentle pendulum activity with physical therapy. She is agreeable with the plan.     PLAN:    Nonweightbearing left upper extremity    Sling for comfort    Ice    Physical therapy for pendulum and proximal humerus guidelines follow-up in 2 weeks for x-rays, call sooner with any questions concerns or need for reevaluation    ELECTRONICALLY signed by:    Claudia Sanders MD  9/29/21

## 2021-10-01 ENCOUNTER — TELEPHONE (OUTPATIENT)
Dept: ORTHOPEDIC SURGERY | Age: 76
End: 2021-10-01

## 2021-10-01 NOTE — TELEPHONE ENCOUNTER
At 9/29/21 appointment for ED f/u 9/12, Fx humeral neck, left; TTS. Patient instructed to follow up in 2 weeks. Please advise patient for appointment [phone].

## 2021-10-05 ENCOUNTER — TELEPHONE (OUTPATIENT)
Dept: ORTHOPEDIC SURGERY | Age: 76
End: 2021-10-05

## 2021-10-05 NOTE — TELEPHONE ENCOUNTER
Pt called in wanting to know if she can get a refill on the medication hydrocodone? Shireen Wagner can be reached at [phone]. Thank you.

## 2021-10-06 DIAGNOSIS — S42.202A CLOSED FRACTURE OF PROXIMAL END OF LEFT HUMERUS, UNSPECIFIED FRACTURE MORPHOLOGY, INITIAL ENCOUNTER: Primary | ICD-10-CM

## 2021-10-06 RX ORDER — HYDROCODONE BITARTRATE AND ACETAMINOPHEN 5; 325 MG/1; MG/1
1 TABLET ORAL 2 TIMES DAILY PRN
Qty: 14 TABLET | Refills: 0 | Status: SHIPPED | OUTPATIENT
Start: 2021-10-06 | End: 2021-10-13

## 2021-10-06 NOTE — TELEPHONE ENCOUNTER
Pt called in requesting refill of Norco.    DOI: 9/12/21  Injury: Closed fracture of proximal end of left humerus, unspecified fracture morphology, initial encounter     Order pended and routed for decision and signature.

## 2021-10-06 NOTE — TELEPHONE ENCOUNTER
Norco refilled and weaned to BID PRN    Controlled Substance Monitoring:    Acute and Chronic Pain Monitoring:   RX Monitoring 10/6/2021   Periodic Controlled Substance Monitoring No signs of potential drug abuse or diversion identified.

## 2021-10-07 ENCOUNTER — TELEPHONE (OUTPATIENT)
Dept: ORTHOPEDIC SURGERY | Age: 76
End: 2021-10-07

## 2021-10-07 DIAGNOSIS — S42.202A CLOSED FRACTURE OF PROXIMAL END OF LEFT HUMERUS, UNSPECIFIED FRACTURE MORPHOLOGY, INITIAL ENCOUNTER: Primary | ICD-10-CM

## 2021-10-13 ENCOUNTER — OFFICE VISIT (OUTPATIENT)
Dept: ORTHOPEDIC SURGERY | Age: 76
End: 2021-10-13
Payer: MEDICARE

## 2021-10-13 ENCOUNTER — HOSPITAL ENCOUNTER (OUTPATIENT)
Dept: GENERAL RADIOLOGY | Age: 76
Discharge: HOME | End: 2021-10-15
Payer: MEDICARE

## 2021-10-13 VITALS — TEMPERATURE: 97.8 F

## 2021-10-13 DIAGNOSIS — S42.202A CLOSED FRACTURE OF PROXIMAL END OF LEFT HUMERUS, UNSPECIFIED FRACTURE MORPHOLOGY, INITIAL ENCOUNTER: ICD-10-CM

## 2021-10-13 DIAGNOSIS — S42.292D OTHER CLOSED DISPLACED FRACTURE OF PROXIMAL END OF LEFT HUMERUS WITH ROUTINE HEALING, SUBSEQUENT ENCOUNTER: Primary | ICD-10-CM

## 2021-10-13 PROCEDURE — 99213 OFFICE O/P EST LOW 20 MIN: CPT | Performed by: PHYSICIAN ASSISTANT

## 2021-10-13 PROCEDURE — 1036F TOBACCO NON-USER: CPT | Performed by: PHYSICIAN ASSISTANT

## 2021-10-13 PROCEDURE — 4040F PNEUMOC VAC/ADMIN/RCVD: CPT | Performed by: PHYSICIAN ASSISTANT

## 2021-10-13 PROCEDURE — 73030 X-RAY EXAM OF SHOULDER: CPT

## 2021-10-13 PROCEDURE — 3017F COLORECTAL CA SCREEN DOC REV: CPT | Performed by: PHYSICIAN ASSISTANT

## 2021-10-13 PROCEDURE — G8400 PT W/DXA NO RESULTS DOC: HCPCS | Performed by: PHYSICIAN ASSISTANT

## 2021-10-13 PROCEDURE — G8417 CALC BMI ABV UP PARAM F/U: HCPCS | Performed by: PHYSICIAN ASSISTANT

## 2021-10-13 PROCEDURE — G8484 FLU IMMUNIZE NO ADMIN: HCPCS | Performed by: PHYSICIAN ASSISTANT

## 2021-10-13 PROCEDURE — G8427 DOCREV CUR MEDS BY ELIG CLIN: HCPCS | Performed by: PHYSICIAN ASSISTANT

## 2021-10-13 PROCEDURE — 1123F ACP DISCUSS/DSCN MKR DOCD: CPT | Performed by: PHYSICIAN ASSISTANT

## 2021-10-13 PROCEDURE — 99212 OFFICE O/P EST SF 10 MIN: CPT

## 2021-10-13 PROCEDURE — 1090F PRES/ABSN URINE INCON ASSESS: CPT | Performed by: PHYSICIAN ASSISTANT

## 2021-10-13 RX ORDER — IBUPROFEN 200 MG
200 TABLET ORAL EVERY 6 HOURS PRN
COMMUNITY
End: 2022-01-27 | Stop reason: ALTCHOICE

## 2021-10-13 RX ORDER — METHOCARBAMOL 500 MG/1
500 TABLET, FILM COATED ORAL 3 TIMES DAILY PRN
Qty: 30 TABLET | Refills: 0 | Status: SHIPPED | OUTPATIENT
Start: 2021-10-13 | End: 2021-10-23

## 2021-10-13 NOTE — PATIENT INSTRUCTIONS
OK to start physical therapy  Want to focus on \"pendulum exercises\" to start with gravity to allow for traction    Muscle relaxer added in addition to advil and/or tylenol    Plan for follow up in 7-10 days for repeat evaluation and new xrays to see if there is any further displacement

## 2021-10-13 NOTE — PROGRESS NOTES
Torito Aguillon is a 78 y.o. female who presents for follow up of left arm    SURGEON: Dr. Mireille Sultana MD  Date of Injury: 9/12/2021  Date last seen in office: 9/29/2021    Symptoms: better  New complaints: patient states that she has some pain in bicep. She has discontinued her sling while at home and only wears as needed when out and about. Weightbearing: left upper Non-weight bearing      Assistive device Sling PRN  Participating in therapy (location if yes)?  Not yet, however first appointment is 10/14/2021    Refills Needed: None  Order/Referral Needed: N/A

## 2021-10-13 NOTE — PROGRESS NOTES
Chief Complaint   Patient presents with    Arm Pain     Left prox humerus fx 9/12/2021     DOI 9/12/21  L proximal humerus fracture    SUBJECTIVE: Aicha Head is a very pleasant 78 y.o. female who is RHD. She was last seen in our office approximately 2 weeks ago, now about 4 weeks from DOI. Has been able to wean out of the sling when at home, does wear when she is out and about. Maintains NWB status. She has been having a tough time with pain control over the last week or so. She was supposed to start therapy this past week but due to pain is due to start tomorrow. She denies any new fall, trauma, injury. She denies any radicular pain or neurologic symptoms. She does endorse significant pain over her bicep muscle belly. Endorses significant muscle tightness and soreness. Review of Systems -   General ROS: negative for - chills, fatigue, fever or night sweats  Respiratory ROS: no cough, shortness of breath, or wheezing  Cardiovascular ROS: no chest pain or dyspnea on exertion  Gastrointestinal ROS: no abdominal pain, change in bowel habits, or black or bloody stools  Genitourinary: no hematuria, dysuria, or incontinence   Musculoskeletal ROS:see above  Neurological ROS: no TIA or stroke symptoms       OBJECTIVE:   Alert and oriented X 3, no acute distress, respirations easy and unlabored with no audible wheezes, skin warm and dry, speech and dress appropriate for noted age, affect euthymic.     Extremity:  Left Upper Extremity  Skin is clean dry and intact   No evidence of breakdown, rashes, lesions  Continues with healing ecchymosis noted  mild edema noted  Radial pulse palpable, fingers warm with BCR  Flex/extension intact to wrist, thumb and fingers   Finger opposition intact  Finger adduction/abduction intact  Finger crossover intact  Full active range of motion of the elbow, wrist, hand  Subjectively states sensation intact to Median Nerve, Ulnar Nerve and Radial Nerve distribution      XR: 10/13/21 3 views of the left shoulder demonstrate displaced fracture of the proximal humerus at the level of surgical neck. There is appropriate pseudosubluxation noted given her fracture pattern. There does appear to be caudal displacement of the humerus shaft in relation to the humeral head. This does appear with interval displacement compared to prior imaging. No new fracture, humeral head remains located within the glenoid. Temp 97.8 °F (36.6 °C) (Oral)     ASSESSMENT:     Diagnosis Orders   1. Other closed displaced fracture of proximal end of left humerus with routine healing, subsequent encounter         PLAN:  All x-rays are reviewed with patient today in office. We discussed the interval displacement of the humerus shaft and alignment to her humeral head. We discussed that potentially gravity and relaxation of her muscles could help improve overall alignment to avoid surgical intervention. But if this continues to further displace into valgus alignment may require open reduction with internal fixation  OK to start physical therapy  Want to focus on \"pendulum exercises\" to start with gravity to allow for traction  Muscle relaxer added in addition to advil and/or tylenol    Plan for follow up in 7-10 days for repeat evaluation and new xrays to see if there is any further displacement    Electronically signed by Ministerio Yoo PA-C on 10/13/2021 at 1:51 PM  Note: This report was completed using [employer] voiced recognition software. Every effort has been made to ensure accuracy; however, inadvertent computerized transcription errors may be present.

## 2021-10-19 DIAGNOSIS — S42.292D OTHER CLOSED DISPLACED FRACTURE OF PROXIMAL END OF LEFT HUMERUS WITH ROUTINE HEALING, SUBSEQUENT ENCOUNTER: Primary | ICD-10-CM

## 2021-10-20 ENCOUNTER — HOSPITAL ENCOUNTER (OUTPATIENT)
Dept: GENERAL RADIOLOGY | Age: 76
Discharge: HOME | End: 2021-10-22
Payer: MEDICARE

## 2021-10-20 ENCOUNTER — OFFICE VISIT (OUTPATIENT)
Dept: ORTHOPEDIC SURGERY | Age: 76
End: 2021-10-20
Payer: MEDICARE

## 2021-10-20 VITALS — TEMPERATURE: 97.1 F

## 2021-10-20 DIAGNOSIS — S42.292D OTHER CLOSED DISPLACED FRACTURE OF PROXIMAL END OF LEFT HUMERUS WITH ROUTINE HEALING, SUBSEQUENT ENCOUNTER: ICD-10-CM

## 2021-10-20 DIAGNOSIS — S42.292D OTHER CLOSED DISPLACED FRACTURE OF PROXIMAL END OF LEFT HUMERUS WITH ROUTINE HEALING, SUBSEQUENT ENCOUNTER: Primary | ICD-10-CM

## 2021-10-20 PROCEDURE — G8427 DOCREV CUR MEDS BY ELIG CLIN: HCPCS | Performed by: PHYSICIAN ASSISTANT

## 2021-10-20 PROCEDURE — 1123F ACP DISCUSS/DSCN MKR DOCD: CPT | Performed by: PHYSICIAN ASSISTANT

## 2021-10-20 PROCEDURE — 99212 OFFICE O/P EST SF 10 MIN: CPT

## 2021-10-20 PROCEDURE — 4040F PNEUMOC VAC/ADMIN/RCVD: CPT | Performed by: PHYSICIAN ASSISTANT

## 2021-10-20 PROCEDURE — 3017F COLORECTAL CA SCREEN DOC REV: CPT | Performed by: PHYSICIAN ASSISTANT

## 2021-10-20 PROCEDURE — G8400 PT W/DXA NO RESULTS DOC: HCPCS | Performed by: PHYSICIAN ASSISTANT

## 2021-10-20 PROCEDURE — 99213 OFFICE O/P EST LOW 20 MIN: CPT | Performed by: PHYSICIAN ASSISTANT

## 2021-10-20 PROCEDURE — G8417 CALC BMI ABV UP PARAM F/U: HCPCS | Performed by: PHYSICIAN ASSISTANT

## 2021-10-20 PROCEDURE — G8484 FLU IMMUNIZE NO ADMIN: HCPCS | Performed by: PHYSICIAN ASSISTANT

## 2021-10-20 PROCEDURE — 1036F TOBACCO NON-USER: CPT | Performed by: PHYSICIAN ASSISTANT

## 2021-10-20 PROCEDURE — 1090F PRES/ABSN URINE INCON ASSESS: CPT | Performed by: PHYSICIAN ASSISTANT

## 2021-10-20 PROCEDURE — 73030 X-RAY EXAM OF SHOULDER: CPT

## 2021-10-20 NOTE — PROGRESS NOTES
Patient presents today for 4 wk f/u  w/ xrays. 9/12, Fx humeral neck, left; TTS. Patient states she is not having any pain, she is sore, however she did have PT yesterday and they \"really worked her arm\". Patient states she feels like she is progressing, however she isn't sure if it is to the point she should be at.

## 2021-10-20 NOTE — PROGRESS NOTES
Chief Complaint   Patient presents with    Follow-up     4 wk f/u  w/ xrays. 9/12, Fx humeral neck, left; TTS       SUBJECTIVE: Douglas Franco presents for a follow-up visit. She is now 5 1/2 weeks out from sustaining a left proximal humerus fracture treated nonoperatively. She states that she is really not having any pain and making progress in physical therapy. She feels that the motion is getting better and her shoulder and arm. She denies numbness, tingling or paresthesias. Review of Systems -   General ROS: negative for - chills, fatigue, fever or night sweats  Respiratory ROS: no cough, shortness of breath, or wheezing  Cardiovascular ROS: no chest pain or dyspnea on exertion  Gastrointestinal ROS: no abdominal pain, change in bowel habits, or black or bloody stools  Genitourinary: no hematuria, dysuria, or incontinence   Musculoskeletal ROS:see above  Neurological ROS: no TIA or stroke symptoms       OBJECTIVE:   Alert and oriented X 3, no acute distress, respirations easy and unlabored with no audible wheezes, skin warm and dry, speech and dress appropriate for noted age, affect euthymic. Extremity:  Left Upper Extremity  Skin is clean dry and intact  Mild edema noted  Radial pulse palpable, fingers warm with BCR  Flex/extension intact to wrist, thumb and fingers  Finger opposition intact  Finger adduction/abduction intact  Finger crossover intact  Subjectively states sensation intact to radial/medial/ulnar distribution  Good motion of the elbow, wrist and digits. Actively, she can abduct and forward flex the left shoulder to approximately 50 degrees without pain. XR: 10/20/21   2 views left shoulder demonstrate a moderately displaced left humeral neck fracture with early callus formation noted. No significant increased displacement compared to her prior films. Temp 97.1 °F (36.2 °C) (Oral)     ASSESSMENT:   Diagnosis Orders   1.  Other closed displaced fracture of proximal end of left

## 2021-11-12 ENCOUNTER — TELEPHONE (OUTPATIENT)
Dept: ORTHOPEDIC SURGERY | Age: 76
End: 2021-11-12

## 2021-11-12 DIAGNOSIS — S42.292D OTHER CLOSED DISPLACED FRACTURE OF PROXIMAL END OF LEFT HUMERUS WITH ROUTINE HEALING, SUBSEQUENT ENCOUNTER: Primary | ICD-10-CM

## 2021-11-17 ENCOUNTER — OFFICE VISIT (OUTPATIENT)
Dept: ORTHOPEDIC SURGERY | Age: 76
End: 2021-11-17
Payer: MEDICARE

## 2021-11-17 ENCOUNTER — HOSPITAL ENCOUNTER (OUTPATIENT)
Dept: GENERAL RADIOLOGY | Age: 76
Discharge: HOME | End: 2021-11-19
Payer: MEDICARE

## 2021-11-17 VITALS — TEMPERATURE: 98.6 F

## 2021-11-17 DIAGNOSIS — S42.292D OTHER CLOSED DISPLACED FRACTURE OF PROXIMAL END OF LEFT HUMERUS WITH ROUTINE HEALING, SUBSEQUENT ENCOUNTER: ICD-10-CM

## 2021-11-17 DIAGNOSIS — S42.292D OTHER CLOSED DISPLACED FRACTURE OF PROXIMAL END OF LEFT HUMERUS WITH ROUTINE HEALING, SUBSEQUENT ENCOUNTER: Primary | ICD-10-CM

## 2021-11-17 PROCEDURE — 73030 X-RAY EXAM OF SHOULDER: CPT

## 2021-11-17 PROCEDURE — 99212 OFFICE O/P EST SF 10 MIN: CPT | Performed by: PHYSICIAN ASSISTANT

## 2021-11-17 PROCEDURE — 99024 POSTOP FOLLOW-UP VISIT: CPT | Performed by: PHYSICIAN ASSISTANT

## 2021-11-17 NOTE — PATIENT INSTRUCTIONS
Okay to progress to weightbearing as tolerated, strengthening as tolerated, range of motion as tolerated    If not making much progress with your range of motion over the next 3 to 4 weeks with aggressive physical therapy contact the office, we can consider a corticosteroid injection to the shoulder to help with capsulitis    Plan for follow-up in 6 weeks approximately for repeat x-rays and evaluation.

## 2021-11-17 NOTE — PROGRESS NOTES
Chief Complaint   Patient presents with    Follow-up     Follow up for left humeral neck fx TTS       SUBJECTIVE: Maribel Morrow presents for a follow-up visit. She is now 8 weeks out from sustaining a left proximal humerus fracture treated nonoperatively. She states that she is really not having any pain and feels she is making progress in physical therapy. She feels that the motion is getting better and her shoulder and arm. She denies numbness, tingling or paresthesias. Patient has been using the arm for all ADLs at home, states she did do a lot of additional activity yesterday and worked with PT this morning so does have some soreness but this is tolerable. Denies any new fall/injury since last seen in the office. Review of Systems -   General ROS: negative for - chills, fatigue, fever or night sweats  Respiratory ROS: no cough, shortness of breath, or wheezing  Cardiovascular ROS: no chest pain or dyspnea on exertion  Gastrointestinal ROS: no abdominal pain, change in bowel habits, or black or bloody stools  Genitourinary: no hematuria, dysuria, or incontinence   Musculoskeletal ROS:see above  Neurological ROS: no TIA or stroke symptoms       OBJECTIVE:   Alert and oriented X 3, no acute distress, respirations easy and unlabored with no audible wheezes, skin warm and dry, speech and dress appropriate for noted age, affect euthymic. Extremity:  Left Upper Extremity  Skin is clean dry and intact  Mild edema noted  NO significant TTP to the shoulder on exam  Radial pulse palpable, fingers warm with BCR  Flex/extension intact to wrist, thumb and fingers  Finger opposition intact  Finger adduction/abduction intact  Finger crossover intact  Subjectively states sensation intact to radial/medial/ulnar distribution  Good motion of the elbow, wrist and digits.   Actively, she can abduct and forward flex the left shoulder to approximately 75°  without pain, able to IR to L3 without pain at the shoulder    XR: 11/17/21   3 views left shoulder demonstrate a moderately displaced left humeral neck fracture with interval callus formation noted. Appropriate pseudosubluxation of glenohumeral joint. No interval change in overall alignment on imaging. Temp 98.6 °F (37 °C) (Oral)     ASSESSMENT:   Diagnosis Orders   1. Other closed displaced fracture of proximal end of left humerus with routine healing, subsequent encounter       PLAN:  X rays reviewed and discussed. Patient may progress to WBAT on LUE, ROM and activities as she tolerates  Recommended that if not making continued progress with ROM or feels she is getting stuck on ROM over the next 3-4 weeks, can consider CSI to the shoulder due to some signs of capuslitis  Plan for follow up in approximately 6 weeks if still doing well  We did discuss briefly surgical option to convert to rTSA if patient continues to have pain or has limited ability to perform ADLs. Electronically signed by Maribell Vega PA-C on 11/17/2021 at 2:44 PM  Note: This report was completed using [employer] voiced recognition software. Every effort has been made to ensure accuracy; however, inadvertent computerized transcription errors may be present.

## 2022-01-04 ENCOUNTER — TELEPHONE (OUTPATIENT)
Dept: ORTHOPEDIC SURGERY | Age: 77
End: 2022-01-04

## 2022-01-04 DIAGNOSIS — S42.292D OTHER CLOSED DISPLACED FRACTURE OF PROXIMAL END OF LEFT HUMERUS WITH ROUTINE HEALING, SUBSEQUENT ENCOUNTER: Primary | ICD-10-CM

## 2022-01-05 ENCOUNTER — HOSPITAL ENCOUNTER (OUTPATIENT)
Dept: GENERAL RADIOLOGY | Age: 77
Discharge: HOME | End: 2022-01-07
Payer: MEDICARE

## 2022-01-05 ENCOUNTER — OFFICE VISIT (OUTPATIENT)
Dept: ORTHOPEDIC SURGERY | Age: 77
End: 2022-01-05
Payer: MEDICARE

## 2022-01-05 VITALS — TEMPERATURE: 98.1 F

## 2022-01-05 DIAGNOSIS — S42.292D OTHER CLOSED DISPLACED FRACTURE OF PROXIMAL END OF LEFT HUMERUS WITH ROUTINE HEALING, SUBSEQUENT ENCOUNTER: Primary | ICD-10-CM

## 2022-01-05 DIAGNOSIS — S42.292D OTHER CLOSED DISPLACED FRACTURE OF PROXIMAL END OF LEFT HUMERUS WITH ROUTINE HEALING, SUBSEQUENT ENCOUNTER: ICD-10-CM

## 2022-01-05 PROCEDURE — 73030 X-RAY EXAM OF SHOULDER: CPT

## 2022-01-05 PROCEDURE — 99024 POSTOP FOLLOW-UP VISIT: CPT | Performed by: PHYSICIAN ASSISTANT

## 2022-01-05 PROCEDURE — 99212 OFFICE O/P EST SF 10 MIN: CPT | Performed by: PHYSICIAN ASSISTANT

## 2022-01-05 NOTE — PROGRESS NOTES
Chief Complaint   Patient presents with    Arm Injury     left humeral neck fracture 09/12/21. c/o occasional \"achiness\" and swelling. held therapy for a couple weeks, returning to therapy tomorrow. SUBJECTIVE: Senait Morrissey presents for a follow-up visit. She is now 12 weeks out from sustaining a left proximal humerus fracture treated nonoperatively. Describes an achiness to the shoulder, does endorse some limited motion, has been without PT x 2 weeks due to holidays for which she attributes her stiffness. She denies numbness, tingling or paresthesias. Patient has been using the arm for all ADLs at home. Patient denies consistent use of OTC medications for discomfort. Denies any new fall/injury since last seen in the office. Review of Systems -   General ROS: negative for - chills, fatigue, fever or night sweats  Respiratory ROS: no cough, shortness of breath, or wheezing  Cardiovascular ROS: no chest pain or dyspnea on exertion  Gastrointestinal ROS: no abdominal pain, change in bowel habits, or black or bloody stools  Genitourinary: no hematuria, dysuria, or incontinence   Musculoskeletal ROS:see above  Neurological ROS: no TIA or stroke symptoms       OBJECTIVE:   Alert and oriented X 3, no acute distress, respirations easy and unlabored with no audible wheezes, skin warm and dry, speech and dress appropriate for noted age, affect euthymic. Extremity:  Left Upper Extremity  Skin is clean dry and intact  Mild edema noted  Mild TTP at the [facility]  Radial pulse palpable, fingers warm with BCR  Flex/extension intact to wrist, thumb and fingers  Finger opposition intact  Finger adduction/abduction intact  Finger crossover intact  Subjectively states sensation intact to radial/medial/ulnar distribution  Good motion of the elbow, wrist and digits.   Actively, AROM FF 75, ABD 45, IR to L2    XR: 1/5/22 3V of the left shoulder demonstrates sequeale of prior humerus neck fracture with impaction, this appears well healed without interval change in alignment    Temp 98.1 °F (36.7 °C) (Oral)     ASSESSMENT:     Diagnosis Orders   1. Other closed displaced fracture of proximal end of left humerus with routine healing, subsequent encounter           PLAN:  WBAT  Progress with ROM and strengthening as tolerated  Discussed that if not improving with consistent PT to consider CSI due to s/s of adhesive capsulitis  Discussed DM and CSI and impact on hyperglycemia, per patient last A1C ~8  Would like to reassess in approximately 4 weeks      Electronically signed by Alphonse Lawrence PA-C on 1/5/2022 at 5:52 PM  Note: This report was completed using [employer] voiced recognition software. Every effort has been made to ensure accuracy; however, inadvertent computerized transcription errors may be present.

## 2022-01-24 ENCOUNTER — TELEPHONE (OUTPATIENT)
Dept: ADMINISTRATIVE | Age: 77
End: 2022-01-24

## 2022-01-24 NOTE — TELEPHONE ENCOUNTER
She was on Dr. Patel Finn schedule for tomorrow at 2:20, but she called and canceled the appointment because she is having a colonoscopy on Wednesday and would be doing prep tomorrow. Unfortunately, she will have to be scheduled with another physician for clearance.

## 2022-01-24 NOTE — TELEPHONE ENCOUNTER
LM on  to call and schedule an OV with Cardiology/Clearance - only available apt at this point before the 8th is Dr. Sudha Coughlin in the [place] office.

## 2022-01-24 NOTE — TELEPHONE ENCOUNTER
Dr. Elva De La Rosa pt calling cardiac clearance per [facility] for Cataract surgery on: 2/8/22. Last saw [facility] EMERGENCY [facility] in [address] on: 10/15/20. No available apts with Dr. Curt Jett. Ok to schedule with 1st available phys?

## 2022-01-26 ENCOUNTER — TELEPHONE (OUTPATIENT)
Dept: ORTHOPEDIC SURGERY | Age: 77
End: 2022-01-26

## 2022-01-26 DIAGNOSIS — S42.292D OTHER CLOSED DISPLACED FRACTURE OF PROXIMAL END OF LEFT HUMERUS WITH ROUTINE HEALING, SUBSEQUENT ENCOUNTER: Primary | ICD-10-CM

## 2022-01-27 ENCOUNTER — OFFICE VISIT (OUTPATIENT)
Dept: CARDIOLOGY CLINIC | Age: 77
End: 2022-01-27
Payer: MEDICARE

## 2022-01-27 VITALS
HEIGHT: 64 IN | SYSTOLIC BLOOD PRESSURE: 130 MMHG | DIASTOLIC BLOOD PRESSURE: 70 MMHG | WEIGHT: 169 LBS | BODY MASS INDEX: 28.85 KG/M2 | RESPIRATION RATE: 18 BRPM | HEART RATE: 66 BPM

## 2022-01-27 DIAGNOSIS — E08.00 DIABETES MELLITUS DUE TO UNDERLYING CONDITION WITH HYPEROSMOLARITY WITHOUT COMA, WITHOUT LONG-TERM CURRENT USE OF INSULIN (HCC): ICD-10-CM

## 2022-01-27 DIAGNOSIS — H25.019 CORTICAL AGE-RELATED CATARACT, UNSPECIFIED LATERALITY: ICD-10-CM

## 2022-01-27 DIAGNOSIS — I51.9 HEART PROBLEM: ICD-10-CM

## 2022-01-27 DIAGNOSIS — Z01.810 PREOP CARDIOVASCULAR EXAM: Primary | ICD-10-CM

## 2022-01-27 DIAGNOSIS — I42.8 OTHER CARDIOMYOPATHY (HCC): ICD-10-CM

## 2022-01-27 DIAGNOSIS — E78.5 HYPERLIPIDEMIA, UNSPECIFIED HYPERLIPIDEMIA TYPE: ICD-10-CM

## 2022-01-27 DIAGNOSIS — I10 ESSENTIAL HYPERTENSION: ICD-10-CM

## 2022-01-27 DIAGNOSIS — I47.1 PAROXYSMAL SVT (SUPRAVENTRICULAR TACHYCARDIA) (HCC): ICD-10-CM

## 2022-01-27 DIAGNOSIS — K21.9 GASTROESOPHAGEAL REFLUX DISEASE WITHOUT ESOPHAGITIS: ICD-10-CM

## 2022-01-27 PROCEDURE — 3017F COLORECTAL CA SCREEN DOC REV: CPT | Performed by: INTERNAL MEDICINE

## 2022-01-27 PROCEDURE — G8400 PT W/DXA NO RESULTS DOC: HCPCS | Performed by: INTERNAL MEDICINE

## 2022-01-27 PROCEDURE — 1036F TOBACCO NON-USER: CPT | Performed by: INTERNAL MEDICINE

## 2022-01-27 PROCEDURE — G8427 DOCREV CUR MEDS BY ELIG CLIN: HCPCS | Performed by: INTERNAL MEDICINE

## 2022-01-27 PROCEDURE — 1123F ACP DISCUSS/DSCN MKR DOCD: CPT | Performed by: INTERNAL MEDICINE

## 2022-01-27 PROCEDURE — 99203 OFFICE O/P NEW LOW 30 MIN: CPT | Performed by: INTERNAL MEDICINE

## 2022-01-27 PROCEDURE — 4040F PNEUMOC VAC/ADMIN/RCVD: CPT | Performed by: INTERNAL MEDICINE

## 2022-01-27 PROCEDURE — G8417 CALC BMI ABV UP PARAM F/U: HCPCS | Performed by: INTERNAL MEDICINE

## 2022-01-27 PROCEDURE — 93000 ELECTROCARDIOGRAM COMPLETE: CPT | Performed by: INTERNAL MEDICINE

## 2022-01-27 PROCEDURE — 1090F PRES/ABSN URINE INCON ASSESS: CPT | Performed by: INTERNAL MEDICINE

## 2022-01-27 PROCEDURE — G8484 FLU IMMUNIZE NO ADMIN: HCPCS | Performed by: INTERNAL MEDICINE

## 2022-01-27 NOTE — PROGRESS NOTES
Out Patient CARDIOLOGY FOLLOW UP    Name: Rose nAderson    Age: 78 y.o. Date of Service: 1/27/2022      Referring Physician: No admitting provider for patient encounter. Chief Complaint   Patient presents with    Cardiac Clearance     Patient has no complaints. History of Present Illness: 81-year-old female with history of asthma, hyperlipidemia, hypertension, paroxysmal SVT, diabetes, GERD and cataracts who present for follow-up visit. He report doing fine and denies any anginal symptoms. Echocardiogram in 2015 revealed EF of 45% and she has been on metoprolol and lisinopril as well as aspirin and statin and although was supposed to be on Zetia report not taking this medication. She is also on Januvia. She reports she is undergoing evaluation for cataract surgery and states is able to walk up steps without any limitation and able to achieve more than 4 METS report more walking several flights of steps without any limitations and able to go for grocery shopping and walk around her neighborhood without any limitations. While she is able to proceed with surgery without additional cardiac testing because of good functional status, I am going to obtain echocardiogram to guide therapy to make sure systolic function has improved from 2017 and to see if further optimization of guideline directed medical therapy will be appropriate    1. ASCVD. Angiogram 02/2015 [place], [place]). Moderate CAD. 2. Asthma. 3. History of chest pain. 4. Hyperlipidemia. Cholesterol 112 , triglycerides 117, HDL 43, LDL 54 (2016). 5. Hypertension. 6. Paroxysmal SVT. 7. Diabetes mellitus. 8. GERD. 9. Syncope 12/11/2016. 10. Echo 01/13/2017. Severe LA dilatation. LA index 54. Distal septum and apex appear dyskinetic. No myocardial thinning.  Lateral and inferior hypokinesis.  Biplane EF = 45%. Mild MR. ERO = 0.1. Vena contracta 0.2 cm. 11. Lexiscan MPS 01/19/2017. EF 43%. Small fixed apical defect.  Small reversible defect inferoseptal segment. 12. OP cardiac assessment 04/04/2017.  Asymptomatic on metoprolol succinate 100 mg daily. 13. Thyroid function: T4 = 1.6     T3= 2.5  -  04/2018. 14. Echo, 04/12/2018. Biplane EF 46%.  Global hypokinesis. Stage II diastolic dysfunction.  LA mildly dilated.  RVSP normal.   15. ProBNP=  881 - 04/12/2018. 16. OP cardiac assessment, 06/15/18. Systolic 431. No medication changes. 17. Lipid panel 09/2020:  Total cholesterol 159, triglycerides 165, HDL 44, LDL 82         Review of Systems:   Cardiac: As per HPI  General: Denies fever or chills  Pulmonary: As per HPI  HEENT: Denies runny nose  GI: No complaints  : No complaints  Endocrine: Denies night sweats  Musculoskeletal: No complaints  Skin: Dry skin  Neuro: No complaints  Psych: Denies depression    Past Medical History:  Past Medical History:   Diagnosis Date    [facility]     Asthma     [facility]     GERD (gastroesophageal reflux disease)     Hyperlipidemia     Syncope        Past Surgical History:  Past Surgical History:   Procedure Laterality Date    COLONOSCOPY      ENDOSCOPY, COLON, DIAGNOSTIC      TONSILLECTOMY         Family History:  Family History   Problem Relation Age of Onset    High Blood Pressure Mother     Heart Disease Mother 67        stents     Cancer Father         pancreas & liver     No Known Problems Sister     Asthma Brother        Social History:  Social History     Socioeconomic History    Marital status:      Spouse name: Not on file    Number of children: Not on file    Years of education: Not on file    Highest education level: Not on file   Occupational History    Not on file   Tobacco Use    Smoking status: Never Smoker    Smokeless tobacco: Never Used   Vaping Use    Vaping Use: Never used   Substance and Sexual Activity    Alcohol use: No    Drug use: No    Sexual activity: Not on file   Other Topics Concern    Not on file   Social History Narrative 21  Yes Parris Apgar, MD   metoprolol succinate (TOPROL XL) 100 MG extended release tablet TAKE 1 AND 1/2 TABLETS BY MOUTH EVERY DAY 21  Yes Maren Iraheta MD   SITagliptin (JANUVIA) 100 MG tablet Take 100 mg by mouth daily   Yes Historical Provider, MD   PROAIR  (90 Base) MCG/ACT inhaler INL 2 PFS PO 6 TIMES PER DAY PRN 19  Yes Historical Provider, MD   Dextromethorphan-guaiFENesin ([address] DM)  MG TB12 Take by mouth   Yes Historical Provider, MD   mirtazapine (REMERON) 7.5 MG tablet 7.5 mg nightly  18  Yes Historical Provider, MD   aspirin 81 MG tablet Take 81 mg by mouth daily    Yes Historical Provider, MD   pantoprazole (PROTONIX) 20 MG tablet Take 20 mg by mouth daily   Yes Historical Provider, MD   atorvastatin (LIPITOR) 40 MG tablet Take 40 mg by mouth daily   Yes Historical Provider, MD   ezetimibe (ZETIA) 10 MG tablet Take 1 tablet by mouth daily  Patient not taking: Reported on 2022   Historical Provider, MD       Current Medications:  Current Outpatient Medications   Medication Sig Dispense Refill    empagliflozin (JARDIANCE) 25 MG tablet Take 25 mg by mouth daily      fluticasone-salmeterol (WIXELA INHUB) 100-50 MCG/DOSE diskus inhaler Inhale 1 puff into the lungs 2 times daily      TRESIBA FLEXTOUCH 100 UNIT/ML SOPN Inject 10 Units into the skin daily      lisinopril (PRINIVIL;ZESTRIL) 5 MG tablet TAKE 1 TABLET BY MOUTH EVERY DAY 90 tablet 3    metoprolol succinate (TOPROL XL) 100 MG extended release tablet TAKE 1 AND 1/2 TABLETS BY MOUTH EVERY  tablet 3    SITagliptin (JANUVIA) 100 MG tablet Take 100 mg by mouth daily      PROAIR  (90 Base) MCG/ACT inhaler INL 2 PFS PO 6 TIMES PER DAY PRN  0    Dextromethorphan-guaiFENesin (MUCINEX DM)  MG TB12 Take by mouth      mirtazapine (REMERON) 7.5 MG tablet 7.5 mg nightly   2    aspirin 81 MG tablet Take 81 mg by mouth daily       pantoprazole (PROTONIX) 20 MG tablet Take 20 mg by mouth daily      atorvastatin (LIPITOR) 40 MG tablet Take 40 mg by mouth daily      ezetimibe (ZETIA) 10 MG tablet Take 1 tablet by mouth daily (Patient not taking: Reported on 1/5/2022)       Current Facility-Administered Medications   Medication Dose Route Frequency Provider Last Rate Last Admin    perflutren lipid microspheres (DEFINITY) injection 1.65 mg  1.5 mL IntraVENous ONCE PRN Filiberto Rincon MD             Physical Exam:  /70   Pulse 66   Resp 18   Ht 5' 4\" (1.626 m)   Wt 169 lb (76.7 kg)   BMI 29.01 kg/m²   Wt Readings from Last 3 Encounters:   01/27/22 169 lb (76.7 kg)   09/12/21 159 lb (72.1 kg)   10/15/20 167 lb (75.8 kg)       Appearance: Alert and oriented x3 not in acute distress. Skin: Dry skin  Head: Atraumatic  Eyes: Intact extraocular muscles   ENMT: Mucous membranes are moist  Neck: Supple  Lungs: Clear to auscultation  Cardiac: Normal S1 and S2  Abdomen: Protuberant  Extremities: Intact range of motion  Neurologic: No focal neurological deficits  Peripheral Pulses: 2+ peripheral pulses    Intake/Output:  No intake or output data in the 24 hours ending 01/27/22 1528  [unfilled]    Laboratory Tests:  No results for input(s): NA, K, CL, CO2, BUN, CREATININE, GLUCOSE, CALCIUM in the last 72 hours. No results found for: MG  No results for input(s): ALKPHOS, ALT, AST, PROT, BILITOT, BILIDIR, LABALBU in the last 72 hours. No results for input(s): WBC, RBC, HGB, HCT, MCV, MCH, MCHC, RDW, PLT, MPV in the last 72 hours. No results found for: CKTOTAL, CKMB, CKMBINDEX, TROPONINI  No results for input(s): CKTOTAL, CKMB, CKMBINDEX, TROPHS in the last 72 hours.   No results found for: INR, PROTIME  No results found for: TSHFT4, TSH  No results found for: LABA1C  No results found for: EAG  No results found for: CHOL  No results found for: TRIG  No results found for: HDL  No results found for: LDLCALC, LDLCHOLESTEROL  No results found for: LABVLDL, VLDL  No results found for: CHOLHDLRATIO  No results for input(s): PROBNP in the last 72 hours. Cardiac Tests:  EKG reviewed (EKG date: Sinus rhythm 66 bpm occasional PVCs and nonspecific ST-T wave changes as well as interventricular conduction delay):      Lexiscan MPS 01/19/2017. EF 43%. Small fixed apical defect. Small reversible defect inferoseptal segment. Echo, 04/12/2018. Biplane EF 46%.  Global hypokinesis. Stage II diastolic dysfunction.  LA mildly dilated.  RVSP normal    Echocardiogram reviewed:     Stress test reviewed:      Cardiac catheterization reviewed:     CXR reviewed: The ASCVD Risk score (Harry Salas, et al., 2013) failed to calculate for the following reasons:    Cannot find a previous HDL lab    Cannot find a previous total cholesterol lab    ASSESSMENT / PLAN:    1. Preop cardiovascular exam  Ejection fraction is 2018 was 46%  We will obtain echocardiogram to determine current status of EF and left ventricular systolic function  Otherwise patient may proceed to surgery     2. Other cardiomyopathy ([address]  - ECHO COMPLETE; Future  Last EF in 2018 was 46%  Continue lisinopril and beta-blocker and awaiting echocardiogram to optimize guideline directed medical therapy    3. Paroxysmal SVT (supraventricular tachycardia) (HCC)  - ECHO COMPLETE; Future  Denies any symptoms currently  Continue beta-blocker  4. Cataract  Follow-up with ophthalmologist    5. Hyperlipidemia, unspecified hyperlipidemia type  Continue statin therapy and Zetia    6. Essential hypertension  Continue current blood pressure medications and follow-up with your PCP    7.  Diabetes mellitus due to underlying condition with hyperosmolarity without coma, without long-term current use of insulin (HCC)  Continue Januvia and antidiabetic medications and follow-up with your PCP and diabetic  [facility]  8. Gastroesophageal reflux disease without esophagitis     Follow-up with your PCP        FOLLOW-UP in 3        Thank you for allowing me to participate in your patient's care. Please feel free to contact me if you have any questions or concerns.     Maged Benavides MD  [facility]) Cardiology

## 2022-01-28 ENCOUNTER — TELEPHONE (OUTPATIENT)
Dept: CARDIOLOGY | Age: 77
End: 2022-01-28

## 2022-01-28 NOTE — TELEPHONE ENCOUNTER
Echo scheduled 2/2/22 @10. Reviewed Covid-19 checklist with the patient.       Electronically signed by Valentina Whitaker on 1/28/2022 at 9:10 AM

## 2022-02-02 ENCOUNTER — HOSPITAL ENCOUNTER (OUTPATIENT)
Dept: CARDIOLOGY | Age: 77
Discharge: HOME | End: 2022-02-02
Payer: MEDICARE

## 2022-02-02 ENCOUNTER — OFFICE VISIT (OUTPATIENT)
Dept: ORTHOPEDIC SURGERY | Age: 77
End: 2022-02-02
Payer: MEDICARE

## 2022-02-02 ENCOUNTER — HOSPITAL ENCOUNTER (OUTPATIENT)
Dept: GENERAL RADIOLOGY | Age: 77
Discharge: HOME | End: 2022-02-04
Payer: MEDICARE

## 2022-02-02 VITALS — TEMPERATURE: 98.3 F

## 2022-02-02 DIAGNOSIS — S42.292D OTHER CLOSED DISPLACED FRACTURE OF PROXIMAL END OF LEFT HUMERUS WITH ROUTINE HEALING, SUBSEQUENT ENCOUNTER: Primary | ICD-10-CM

## 2022-02-02 DIAGNOSIS — I47.1 PAROXYSMAL SVT (SUPRAVENTRICULAR TACHYCARDIA) (HCC): ICD-10-CM

## 2022-02-02 DIAGNOSIS — S42.292D OTHER CLOSED DISPLACED FRACTURE OF PROXIMAL END OF LEFT HUMERUS WITH ROUTINE HEALING, SUBSEQUENT ENCOUNTER: ICD-10-CM

## 2022-02-02 DIAGNOSIS — I42.8 OTHER CARDIOMYOPATHY (HCC): ICD-10-CM

## 2022-02-02 DIAGNOSIS — E78.5 HYPERLIPIDEMIA, UNSPECIFIED HYPERLIPIDEMIA TYPE: ICD-10-CM

## 2022-02-02 LAB
LV EF: 63 %
LVEF MODALITY: NORMAL

## 2022-02-02 PROCEDURE — 99213 OFFICE O/P EST LOW 20 MIN: CPT | Performed by: PHYSICIAN ASSISTANT

## 2022-02-02 PROCEDURE — 1036F TOBACCO NON-USER: CPT | Performed by: PHYSICIAN ASSISTANT

## 2022-02-02 PROCEDURE — G8417 CALC BMI ABV UP PARAM F/U: HCPCS | Performed by: PHYSICIAN ASSISTANT

## 2022-02-02 PROCEDURE — 1090F PRES/ABSN URINE INCON ASSESS: CPT | Performed by: PHYSICIAN ASSISTANT

## 2022-02-02 PROCEDURE — G8484 FLU IMMUNIZE NO ADMIN: HCPCS | Performed by: PHYSICIAN ASSISTANT

## 2022-02-02 PROCEDURE — G8400 PT W/DXA NO RESULTS DOC: HCPCS | Performed by: PHYSICIAN ASSISTANT

## 2022-02-02 PROCEDURE — 93306 TTE W/DOPPLER COMPLETE: CPT | Performed by: PSYCHIATRY & NEUROLOGY

## 2022-02-02 PROCEDURE — 4040F PNEUMOC VAC/ADMIN/RCVD: CPT | Performed by: PHYSICIAN ASSISTANT

## 2022-02-02 PROCEDURE — 99212 OFFICE O/P EST SF 10 MIN: CPT | Performed by: PHYSICIAN ASSISTANT

## 2022-02-02 PROCEDURE — 1123F ACP DISCUSS/DSCN MKR DOCD: CPT | Performed by: PHYSICIAN ASSISTANT

## 2022-02-02 PROCEDURE — 73030 X-RAY EXAM OF SHOULDER: CPT

## 2022-02-02 PROCEDURE — G8427 DOCREV CUR MEDS BY ELIG CLIN: HCPCS | Performed by: PHYSICIAN ASSISTANT

## 2022-02-02 PROCEDURE — 3017F COLORECTAL CA SCREEN DOC REV: CPT | Performed by: PHYSICIAN ASSISTANT

## 2022-02-02 NOTE — PROGRESS NOTES
Chief Complaint   Patient presents with    Arm Injury     left humeral neck fracture 09/12/21 nonop. c/o pain when lying on left side. no other complaints. doing therapy at [facility]. Subjective:  Aracelis Olivas is approximately 4 mo from above DOI. Participating in OT 2x per week with several more weeks added on for strengthening phase. States she is happy now with her ROM of the L shoulder and has noticed improvement. States she still checks blood glucose and has been running high 100s when baseline is low 100s. Review of Systems -  all pertinent positives and negatives in HPI. Objective:    General: Alert and oriented X 3, normocephalic atraumatic, external ears and eye normal, sclera clear, no acute distress, respirations easy and unlabored with no audible wheezes, skin warm and dry, speech and dress appropriate for noted age, affect euthymic. Extremity:  Left Upper Extremity  Skin is clean dry and intact  Mild edema noted about the posterior proximal arm  AROM L shoulder ER to 80, IR 60, flexion 135, abduction almost 100  nontender about the proximal arm and shoulder   Radial pulse palpable, fingers warm with BCR  Flex/extension intact to elbow, wrist, thumb and fingers  Finger opposition intact  Finger adduction/abduction intact  Finger crossover intact  Subjectively states sensation intact to radial/medial/ulnar distribution        Temp 98.3 °F (36.8 °C) (Oral)     XR:   3 views of L shoulder  demonstrating interval healing of L proximal humerus. No significant change in alignment. No acute fractures or dislocations or any other osseus abnormality identified. Assessment:   Diagnosis Orders   1. Other closed displaced fracture of proximal end of left humerus with routine healing, subsequent encounter         Plan:   Reviewed x-rays with patient today in office    WBAT L UE.  No restrictions to ROM   Continue aggressive OT for strengthening and ROM   otc analgesics PRN   Continue monitoring and controlling blood glucose to avoid adhesive capsulitis     Follow up PRN    Electronically signed by Enid Garcia PA-C on 2/2/2022 at 3:30 PM  Note: This report was completed using computer"[employer] voiced recognition software. Every effort has been made to ensure accuracy; however, inadvertent computerized transcription errors may be present.

## 2022-02-07 ENCOUNTER — TELEPHONE (OUTPATIENT)
Dept: CARDIOLOGY CLINIC | Age: 77
End: 2022-02-07

## 2022-02-09 ENCOUNTER — TELEPHONE (OUTPATIENT)
Dept: CARDIOLOGY CLINIC | Age: 77
End: 2022-02-09

## 2022-02-09 NOTE — TELEPHONE ENCOUNTER
Left message for patient to contact office. ----- Message from Pauline Tam MD sent at 2/4/2022 11:35 AM EST -----  Heart is pumping fine. Details will be discussed during follow-up visit.

## 2022-02-09 NOTE — TELEPHONE ENCOUNTER
Patient returned our call and was given echo results per Dr. Marc Rick. She verbalized understanding. Clearance for eye surgery has been forwarded to [facility].

## 2022-02-09 NOTE — TELEPHONE ENCOUNTER
Recent ejection fraction is estimated at 60 to 65%. Patient reported able to achieve more than 4 METS during office visit and may proceed to surgery without additional cardiac testing.

## 2022-04-28 ENCOUNTER — OFFICE VISIT (OUTPATIENT)
Dept: CARDIOLOGY CLINIC | Age: 77
End: 2022-04-28
Payer: MEDICARE

## 2022-04-28 VITALS
SYSTOLIC BLOOD PRESSURE: 128 MMHG | HEIGHT: 64 IN | RESPIRATION RATE: 14 BRPM | HEART RATE: 63 BPM | WEIGHT: 171.9 LBS | BODY MASS INDEX: 29.35 KG/M2 | DIASTOLIC BLOOD PRESSURE: 70 MMHG

## 2022-04-28 DIAGNOSIS — I10 ESSENTIAL HYPERTENSION: ICD-10-CM

## 2022-04-28 DIAGNOSIS — I51.9 HEART PROBLEM: Primary | ICD-10-CM

## 2022-04-28 DIAGNOSIS — I47.1 PAROXYSMAL SVT (SUPRAVENTRICULAR TACHYCARDIA) (HCC): ICD-10-CM

## 2022-04-28 DIAGNOSIS — E08.00 DIABETES MELLITUS DUE TO UNDERLYING CONDITION WITH HYPEROSMOLARITY WITHOUT COMA, UNSPECIFIED WHETHER LONG TERM INSULIN USE (HCC): ICD-10-CM

## 2022-04-28 DIAGNOSIS — E78.5 DYSLIPIDEMIA: ICD-10-CM

## 2022-04-28 PROCEDURE — 4040F PNEUMOC VAC/ADMIN/RCVD: CPT | Performed by: INTERNAL MEDICINE

## 2022-04-28 PROCEDURE — 1036F TOBACCO NON-USER: CPT | Performed by: INTERNAL MEDICINE

## 2022-04-28 PROCEDURE — 3017F COLORECTAL CA SCREEN DOC REV: CPT | Performed by: INTERNAL MEDICINE

## 2022-04-28 PROCEDURE — 99214 OFFICE O/P EST MOD 30 MIN: CPT | Performed by: INTERNAL MEDICINE

## 2022-04-28 PROCEDURE — G8427 DOCREV CUR MEDS BY ELIG CLIN: HCPCS | Performed by: INTERNAL MEDICINE

## 2022-04-28 PROCEDURE — G8400 PT W/DXA NO RESULTS DOC: HCPCS | Performed by: INTERNAL MEDICINE

## 2022-04-28 PROCEDURE — G8417 CALC BMI ABV UP PARAM F/U: HCPCS | Performed by: INTERNAL MEDICINE

## 2022-04-28 PROCEDURE — 93000 ELECTROCARDIOGRAM COMPLETE: CPT | Performed by: INTERNAL MEDICINE

## 2022-04-28 PROCEDURE — 1090F PRES/ABSN URINE INCON ASSESS: CPT | Performed by: INTERNAL MEDICINE

## 2022-04-28 PROCEDURE — 1123F ACP DISCUSS/DSCN MKR DOCD: CPT | Performed by: INTERNAL MEDICINE

## 2022-04-28 RX ORDER — GLIMEPIRIDE 4 MG/1
TABLET ORAL
COMMUNITY
Start: 2022-02-07

## 2022-04-28 NOTE — PROGRESS NOTES
Out Patient CARDIOLOGY FOLLOW UP    Name: Lilibeth Amor    Age: 78 y.o. Date of Service: 4/28/2022      Referring Physician: No admitting provider for patient encounter. No chief complaint on file. History of Present Illness: 72-year-old female with history of asthma, hyperlipidemia, hypertension, paroxysmal SVT, diabetes, GERD and cataracts who present for follow-up visit. He report doing fine and denies any anginal symptoms. Echocardiogram in 2015 revealed EF of 45% and she has been on metoprolol and lisinopril as well as aspirin and statin and although was supposed to be on Zetia report not taking this medication. She is also on Januvia. Recent echocardiogram revealed EF of 60 to 65%. She reports for follow-up visit today and reports overall doing well and has no symptoms. She mentioned that while she was living in [place] several years she had several visits to the emergency room for management of atrial fibrillation but records from [place] revealed she had SVT instead. Eliquis has been discontinued. 1. ASCVD. Angiogram 02/2015 [place], [place]). Moderate CAD. 2. Asthma. 3. History of chest pain. 4. Hyperlipidemia. Cholesterol 112 , triglycerides 117, HDL 43, LDL 54 (2016). 5. Hypertension. 6. Paroxysmal SVT. 7. Diabetes mellitus. 8. GERD. 9. Syncope 12/11/2016. 10. Echo 01/13/2017. Severe LA dilatation. LA index 54. Distal septum and apex appear dyskinetic. No myocardial thinning.  Lateral and inferior hypokinesis.  Biplane EF = 45%. Mild MR. ERO = 0.1. Vena contracta 0.2 cm. 11. Lexiscan MPS 01/19/2017. EF 43%. Small fixed apical defect. Small reversible defect inferoseptal segment. 12. OP cardiac assessment 04/04/2017.  Asymptomatic on metoprolol succinate 100 mg daily. 13. Thyroid function: T4 = 1.6     T3= 2.5  -  04/2018. 14. Echo, 04/12/2018. Biplane EF 46%.  Global hypokinesis. Stage II diastolic dysfunction.  LA mildly dilated.   RVSP normal. 15. ProBNP=  881 - 04/12/2018. 16. OP cardiac assessment, 06/15/18. Systolic 838. No medication changes. 17. Lipid panel 09/2020:  Total cholesterol 159, triglycerides 165, HDL 44, LDL 82         Review of Systems:   Cardiac: As per HPI  General: Denies fever or chills  Pulmonary: As per HPI  HEENT: Denies runny nose  GI: No complaints  : No complaints  Endocrine: Denies night sweats  Musculoskeletal: No complaints  Skin: Dry skin  Neuro: No complaints  Psych: Denies depression    Past Medical History:  Past Medical History:   Diagnosis Date    [facility] ([facility] [address]     Asthma     [facility]     GERD (gastroesophageal reflux disease)     Hyperlipidemia     Syncope        Past Surgical History:  Past Surgical History:   Procedure Laterality Date    COLONOSCOPY      ENDOSCOPY, COLON, DIAGNOSTIC      TONSILLECTOMY         Family History:  Family History   Problem Relation Age of Onset    High Blood Pressure Mother     Heart Disease Mother 67        stents     Cancer Father         pancreas & liver     No Known Problems Sister     Asthma Brother        Social History:  Social History     Socioeconomic History    Marital status:      Spouse name: Not on file    Number of children: Not on file    Years of education: Not on file    Highest education level: Not on file   Occupational History    Not on file   Tobacco Use    Smoking status: Never Smoker    Smokeless tobacco: Never Used   Vaping Use    Vaping Use: Never used   Substance and Sexual Activity    Alcohol use: No    Drug use: No    Sexual activity: Not on file   Other Topics Concern    Not on file   Social History Narrative    Not on file     Social Determinants of Health     Financial Resource Strain:     Difficulty of Paying Living Expenses: Not on file   Food Insecurity:     Worried About Running Out of Food in the Last Year: Not on file    Jennifer of Food in the Last Year: Not on file   Transportation Needs:     Lack of Transportation (Medical): Not on file    Lack of Transportation (Non-Medical): Not on file   Physical Activity:     Days of Exercise per Week: Not on file    Minutes of Exercise per Session: Not on file   Stress:     Feeling of Stress : Not on file   Social Connections:     Frequency of Communication with Friends and Family: Not on file    Frequency of Social Gatherings with Friends and Family: Not on file    Attends Latter-day Services: Not on file    Active Member of [address] [place] [id number] or Organizations: Not on file    Attends Club or Organization Meetings: Not on file    Marital Status: Not on file   Intimate Partner Violence:     Fear of Current or Ex-Partner: Not on file    Emotionally Abused: Not on file    Physically Abused: Not on file    Sexually Abused: Not on file   Housing Stability:     Unable to Pay for Housing in the Last Year: Not on file    Number of [place] in the Last Year: Not on file    Unstable Housing in the Last Year: Not on file       Allergies: Allergies   Allergen Reactions    Levaquin [Levofloxacin In D5w]      Feet and legs swell       Home Medications:  Prior to Admission medications    Medication Sig Start Date End Date Taking?  Authorizing Provider   empagliflozin (JARDIANCE) 25 MG tablet Take 25 mg by mouth daily   Yes Historical Provider, MD   fluticasone-salmeterol (WIXELA INHUB) 100-50 MCG/DOSE diskus inhaler Inhale 1 puff into the lungs 2 times daily   Yes Historical Provider, MD MACHADO FLEXTOUCH 100 UNIT/ML SOPN Inject 10 Units into the skin daily 9/15/21  Yes Historical Provider, MD   lisinopril (PRINIVIL;ZESTRIL) 5 MG tablet TAKE 1 TABLET BY MOUTH EVERY DAY 6/28/21  Yes Elle Valadez MD   metoprolol succinate (TOPROL XL) 100 MG extended release tablet TAKE 1 AND 1/2 TABLETS BY MOUTH EVERY DAY 2/1/21  Yes Calos Canela MD   SITagliptin (JANUVIA) 100 MG tablet Take 100 mg by mouth daily   Yes Historical Provider, MD   PROAIR  (02 Base) MCG/ACT inhaler INL 2 PFS PO 6 TIMES PER DAY PRN 5/21/19  Yes Historical Provider, MD   Dextromethorphan-guaiFENesin ([address] DM)  MG TB12 Take by mouth   Yes Historical Provider, MD   mirtazapine (REMERON) 7.5 MG tablet 7.5 mg nightly  4/23/18  Yes Historical Provider, MD   aspirin 81 MG tablet Take 81 mg by mouth daily    Yes Historical Provider, MD   pantoprazole (PROTONIX) 20 MG tablet Take 20 mg by mouth daily   Yes Historical Provider, MD   atorvastatin (LIPITOR) 40 MG tablet Take 40 mg by mouth daily   Yes Historical Provider, MD   glimepiride (AMARYL) 4 MG tablet TAKE 1 TABLET BY MOUTH EVERY DAY 2/7/22   Historical Provider, MD       Current Medications:  Current Outpatient Medications   Medication Sig Dispense Refill    empagliflozin (JARDIANCE) 25 MG tablet Take 25 mg by mouth daily      fluticasone-salmeterol (WIXELA INHUB) 100-50 MCG/DOSE diskus inhaler Inhale 1 puff into the lungs 2 times daily      TRESIBA FLEXTOUCH 100 UNIT/ML SOPN Inject 10 Units into the skin daily      lisinopril (PRINIVIL;ZESTRIL) 5 MG tablet TAKE 1 TABLET BY MOUTH EVERY DAY 90 tablet 3    metoprolol succinate (TOPROL XL) 100 MG extended release tablet TAKE 1 AND 1/2 TABLETS BY MOUTH EVERY  tablet 3    SITagliptin (JANUVIA) 100 MG tablet Take 100 mg by mouth daily      PROAIR  (90 Base) MCG/ACT inhaler INL 2 PFS PO 6 TIMES PER DAY PRN  0    Dextromethorphan-guaiFENesin (MUCINEX DM)  MG TB12 Take by mouth      mirtazapine (REMERON) 7.5 MG tablet 7.5 mg nightly   2    aspirin 81 MG tablet Take 81 mg by mouth daily       pantoprazole (PROTONIX) 20 MG tablet Take 20 mg by mouth daily      atorvastatin (LIPITOR) 40 MG tablet Take 40 mg by mouth daily      glimepiride (AMARYL) 4 MG tablet TAKE 1 TABLET BY MOUTH EVERY DAY       Current Facility-Administered Medications   Medication Dose Route Frequency Provider Last Rate Last Admin    perflutren lipid microspheres (DEFINITY) injection 1.65 mg  1.5 mL IntraVENous ONCE PRN Melvin Baltazar MD             Physical Exam:  /70   Pulse 63   Resp 14   Ht 5' 4\" (1.626 m)   Wt 171 lb 14.4 oz (78 kg)   BMI 29.51 kg/m²   Wt Readings from Last 3 Encounters:   04/28/22 171 lb 14.4 oz (78 kg)   01/27/22 169 lb (76.7 kg)   09/12/21 159 lb (72.1 kg)       Appearance: Alert and oriented x3 not in acute distress. Skin: Dry skin  Head: Atraumatic  Eyes: Intact extraocular muscles   ENMT: Mucous membranes are moist  Neck: Supple  Lungs: Clear to auscultation  Cardiac: Normal S1 and S2  Abdomen: Protuberant  Extremities: Intact range of motion  Neurologic: No focal neurological deficits  Peripheral Pulses: 2+ peripheral pulses    Intake/Output:  No intake or output data in the 24 hours ending 04/28/22 1359  [unfilled]    Laboratory Tests:  No results for input(s): NA, K, CL, CO2, BUN, CREATININE, GLUCOSE, CALCIUM in the last 72 hours. No results found for: MG  No results for input(s): ALKPHOS, ALT, AST, PROT, BILITOT, BILIDIR, LABALBU in the last 72 hours. No results for input(s): WBC, RBC, HGB, HCT, MCV, MCH, MCHC, RDW, PLT, MPV in the last 72 hours. No results found for: CKTOTAL, CKMB, CKMBINDEX, TROPONINI  No results for input(s): CKTOTAL, CKMB, CKMBINDEX, TROPHS in the last 72 hours. No results found for: INR, PROTIME  No results found for: TSHFT4, TSH  No results found for: LABA1C  No results found for: EAG  No results found for: CHOL  No results found for: TRIG  No results found for: HDL  No results found for: LDLCALC, LDLCHOLESTEROL  No results found for: LABVLDL, VLDL  No results found for: CHOLHDLRATIO  No results for input(s): PROBNP in the last 72 hours. Cardiac Tests:  EKG reviewed (EKG date: Sinus rhythm 66 bpm occasional PVCs and nonspecific ST-T wave changes as well as interventricular conduction delay):      Lexiscan MPS 01/19/2017. EF 43%. Small fixed apical defect. Small reversible defect inferoseptal segment. Echo, 04/12/2018. Biplane EF 46%.  Global hypokinesis. Stage II diastolic dysfunction.  LA mildly dilated.  RVSP normal    Echocardiogram reviewed:  2/2/2022      Summary   Left ventricular size is grossly normal.   Ejection fraction is visually estimated at 60 to 65%. There is doppler evidence of stage I diastolic dysfunction. Ejection fraction is visually estimated at 60 to 65%. Normal right ventricular size and function. Mild mitral regurgitation is present. The aortic valve appears mildly sclerotic. No hemodynamically significant aortic stenosis is present. Mild aortic stenosis SELVIN 1.5cm2. Mild tricuspid regurgitation. RVSP is 43 mmHg. Stress test reviewed:      Cardiac catheterization reviewed:     CXR reviewed: The ASCVD Risk score (Stan Metz, et al., 2013) failed to calculate for the following reasons:    Cannot find a previous HDL lab    Cannot find a previous total cholesterol lab    ASSESSMENT / PLAN:    1. Paroxysmal SVT  Patient reported she thought she had A. fib but records requested from [place]  shows he was in SVT  Subsequently Eliquis is discontinued  Currently doing well and denies any symptoms  Echocardiogram in February 2022 revealed EF of 60 to 65%      2. Other cardiomyopathy ([address]  EF has improved and now 60 to 65%    3. Paroxysmal SVT (supraventricular tachycardia) (HCC)  Continue beta-blocker    4. Cataract  Follow-up with ophthalmologist    5. Hyperlipidemia, unspecified hyperlipidemia type  Continue statin therapy and Zetia    6. Essential hypertension  Continue current blood pressure medications and follow-up with your PCP    7.  Diabetes mellitus due to underlying condition with hyperosmolarity without coma, without long-term current use of insulin (HCC)  Continue Januvia and antidiabetic medications and follow-up with your PCP and diabetic  [facility]  8. Gastroesophageal reflux disease without esophagitis     Follow-up with your PCP        FOLLOW-UP in 3        Thank you for allowing me to participate in your patient's care. Please feel free to contact me if you have any questions or concerns.     Jassi Crowder MD  [facility]

## 2022-05-04 ENCOUNTER — TELEPHONE (OUTPATIENT)
Dept: CARDIOLOGY CLINIC | Age: 77
End: 2022-05-04

## 2022-05-04 NOTE — TELEPHONE ENCOUNTER
Patient called stating that she had been prescribed Eliquis at her most recent office visit because she states that she thought she had AF. She indicates that she received records from [facility] (these are scanned into [employer]) and she was treated for SVT. She is wondering if she still needs the Eliquis (has not picked it up from pharmacy yet). Please advise.

## 2022-05-06 NOTE — TELEPHONE ENCOUNTER
Please advise patient to stop taking the Eliquis since she does not have A. fib based on the record received.

## 2022-07-18 RX ORDER — METOPROLOL SUCCINATE 100 MG/1
TABLET, EXTENDED RELEASE ORAL
Qty: 270 TABLET | Refills: 3 | Status: SHIPPED | OUTPATIENT
Start: 2022-07-18

## 2023-07-19 RX ORDER — METOPROLOL SUCCINATE 100 MG/1
TABLET, EXTENDED RELEASE ORAL
Qty: 270 TABLET | Refills: 3 | Status: SHIPPED | OUTPATIENT
Start: 2023-07-19

## 2024-07-05 ENCOUNTER — HOSPITAL ENCOUNTER (OUTPATIENT)
Dept: PULMONOLOGY | Age: 79
Discharge: HOME | End: 2024-07-05
Attending: INTERNAL MEDICINE
Payer: MEDICARE

## 2024-07-05 DIAGNOSIS — R06.09 DYSPNEA ON EXERTION: ICD-10-CM

## 2024-07-05 DIAGNOSIS — R05.3 CHRONIC COUGH: ICD-10-CM

## 2024-07-05 PROCEDURE — 94729 DIFFUSING CAPACITY: CPT

## 2024-07-05 PROCEDURE — 94060 EVALUATION OF WHEEZING: CPT

## 2024-07-05 PROCEDURE — 94726 PLETHYSMOGRAPHY LUNG VOLUMES: CPT
